# Patient Record
Sex: FEMALE | Race: WHITE | Employment: PART TIME | URBAN - METROPOLITAN AREA
[De-identification: names, ages, dates, MRNs, and addresses within clinical notes are randomized per-mention and may not be internally consistent; named-entity substitution may affect disease eponyms.]

---

## 2017-02-06 ENCOUNTER — TELEPHONE (OUTPATIENT)
Dept: INTERNAL MEDICINE CLINIC | Facility: CLINIC | Age: 47
End: 2017-02-06

## 2017-02-06 NOTE — TELEPHONE ENCOUNTER
Patient sent a request via [employer] to schedule an appointment. Her comment was \"chest pains continue\". Please advise.

## 2017-02-06 NOTE — TELEPHONE ENCOUNTER
Spoke with pt. Pt states that a few days ago she woke up with pressure (4-5/10) in middle of chest that lasted for a few hours then went away. Denies SOB, radiation of pain to jaw or arm. States that pain went through to back.  Denies nausea, vomiting, jaden

## 2017-02-07 ENCOUNTER — HOSPITAL ENCOUNTER (OUTPATIENT)
Dept: GENERAL RADIOLOGY | Age: 47
Discharge: HOME | End: 2017-02-07
Attending: INTERNAL MEDICINE
Payer: COMMERCIAL

## 2017-02-07 ENCOUNTER — TELEPHONE (OUTPATIENT)
Dept: INTERNAL MEDICINE CLINIC | Facility: CLINIC | Age: 47
End: 2017-02-07

## 2017-02-07 ENCOUNTER — OFFICE VISIT (OUTPATIENT)
Dept: INTERNAL MEDICINE CLINIC | Facility: CLINIC | Age: 47
End: 2017-02-07

## 2017-02-07 VITALS
OXYGEN SATURATION: 98 % | BODY MASS INDEX: 27.13 KG/M2 | RESPIRATION RATE: 16 BRPM | WEIGHT: 172.88 LBS | SYSTOLIC BLOOD PRESSURE: 106 MMHG | DIASTOLIC BLOOD PRESSURE: 62 MMHG | HEIGHT: 66.75 IN | TEMPERATURE: 98 F | HEART RATE: 88 BPM

## 2017-02-07 DIAGNOSIS — R07.9 CHEST PAIN, UNSPECIFIED TYPE: ICD-10-CM

## 2017-02-07 DIAGNOSIS — R94.31 ABNORMAL EKG: ICD-10-CM

## 2017-02-07 DIAGNOSIS — R79.89 ELEVATED TSH: ICD-10-CM

## 2017-02-07 DIAGNOSIS — R07.9 CHEST PAIN, UNSPECIFIED TYPE: Primary | ICD-10-CM

## 2017-02-07 PROCEDURE — 99214 OFFICE O/P EST MOD 30 MIN: CPT | Performed by: INTERNAL MEDICINE

## 2017-02-07 PROCEDURE — 93000 ELECTROCARDIOGRAM COMPLETE: CPT | Performed by: INTERNAL MEDICINE

## 2017-02-07 PROCEDURE — 71020 XR CHEST PA + LAT CHEST (CPT=71020): CPT

## 2017-02-07 NOTE — PROGRESS NOTES
[facility]    CHIEF COMPLAINT:  Patient presents with:  Chest Pain: no chest pain today. Pt states pain comes and goes. Last episode of chest pain was last tue or wed. Pain was relieved after taking ibuprofen.   Pt had abnormal TSH  per pt 4.3 performed in visit on 09/29/16  -SURGICAL PATHOLOGY TISSUE   Result Value Ref Range   Case Report Surgical Pathology                                Case: [id number]                                   Authorizing Provider:  Vanessa Peterson Cardio Referral - In Network  - EKG with interpretation and Report -IN OFFICE [[id number]]    2. Elevated TSH  Will recheck tsh.     3. Abnormal EKG  QT prolonged. Has chest pain. Will refer to cardiology.    - Cardio Referral - In Network          Return to

## 2017-02-08 NOTE — TELEPHONE ENCOUNTER
Incoming (mail or fax): Fax  Received from:  Dr. Lyn Flor office.   Documentation given to:  Placed on Kamryn's desk

## 2017-02-13 ENCOUNTER — LAB ENCOUNTER (OUTPATIENT)
Dept: LAB | Age: 47
End: 2017-02-13
Attending: INTERNAL MEDICINE
Payer: COMMERCIAL

## 2017-02-13 DIAGNOSIS — R07.9 CHEST PAIN, UNSPECIFIED TYPE: ICD-10-CM

## 2017-02-13 LAB
ALBUMIN SERPL-MCNC: 4 G/DL (ref 3.5–4.8)
ALP LIVER SERPL-CCNC: 46 U/L (ref 37–98)
ALT SERPL-CCNC: 17 U/L (ref 14–54)
AST SERPL-CCNC: 15 U/L (ref 15–41)
BASOPHILS # BLD AUTO: 0.03 X10(3) UL (ref 0–0.1)
BASOPHILS NFR BLD AUTO: 0.3 %
BILIRUB SERPL-MCNC: 0.6 MG/DL (ref 0.1–2)
BUN BLD-MCNC: 12 MG/DL (ref 8–20)
CALCIUM BLD-MCNC: 8.4 MG/DL (ref 8.3–10.3)
CHLORIDE: 101 MMOL/L (ref 101–111)
CHOLEST SMN-MCNC: 129 MG/DL (ref ?–200)
CO2: 27 MMOL/L (ref 22–32)
CREAT BLD-MCNC: 0.79 MG/DL (ref 0.55–1.02)
EOSINOPHIL # BLD AUTO: 0.36 X10(3) UL (ref 0–0.3)
EOSINOPHIL NFR BLD AUTO: 4 %
ERYTHROCYTE [DISTWIDTH] IN BLOOD BY AUTOMATED COUNT: 14.8 % (ref 11.5–16)
GLUCOSE BLD-MCNC: 80 MG/DL (ref 70–99)
HCT VFR BLD AUTO: 40.5 % (ref 34–50)
HDLC SERPL-MCNC: 54 MG/DL (ref 45–?)
HDLC SERPL: 2.39 {RATIO} (ref ?–4.44)
HGB BLD-MCNC: 12.5 G/DL (ref 12–16)
IMMATURE GRANULOCYTE COUNT: 0.04 X10(3) UL (ref 0–1)
IMMATURE GRANULOCYTE RATIO %: 0.4 %
LDLC SERPL CALC-MCNC: 51 MG/DL (ref ?–130)
LYMPHOCYTES # BLD AUTO: 1.29 X10(3) UL (ref 0.9–4)
LYMPHOCYTES NFR BLD AUTO: 14.4 %
M PROTEIN MFR SERPL ELPH: 7.5 G/DL (ref 6.1–8.3)
MCH RBC QN AUTO: 26.2 PG (ref 27–33.2)
MCHC RBC AUTO-ENTMCNC: 30.9 G/DL (ref 31–37)
MCV RBC AUTO: 84.7 FL (ref 81–100)
MONOCYTES # BLD AUTO: 0.56 X10(3) UL (ref 0.1–0.6)
MONOCYTES NFR BLD AUTO: 6.2 %
NEUTROPHIL ABS PRELIM: 6.69 X10 (3) UL (ref 1.3–6.7)
NEUTROPHILS # BLD AUTO: 6.69 X10(3) UL (ref 1.3–6.7)
NEUTROPHILS NFR BLD AUTO: 74.7 %
NONHDLC SERPL-MCNC: 75 MG/DL (ref ?–130)
PLATELET # BLD AUTO: 300 10(3)UL (ref 150–450)
POTASSIUM SERPL-SCNC: 4.4 MMOL/L (ref 3.6–5.1)
RBC # BLD AUTO: 4.78 X10(6)UL (ref 3.8–5.1)
RED CELL DISTRIBUTION WIDTH-SD: 45.3 FL (ref 35.1–46.3)
SODIUM SERPL-SCNC: 135 MMOL/L (ref 136–144)
TRIGLYCERIDES: 120 MG/DL (ref ?–150)
TSI SER-ACNC: 2.78 MIU/ML (ref 0.35–5.5)
VLDL: 24 MG/DL (ref 5–40)
WBC # BLD AUTO: 9 X10(3) UL (ref 4–13)

## 2017-02-13 PROCEDURE — 84443 ASSAY THYROID STIM HORMONE: CPT

## 2017-02-13 PROCEDURE — 80053 COMPREHEN METABOLIC PANEL: CPT

## 2017-02-13 PROCEDURE — 80061 LIPID PANEL: CPT

## 2017-02-13 PROCEDURE — 85025 COMPLETE CBC W/AUTO DIFF WBC: CPT

## 2017-02-15 ENCOUNTER — HOSPITAL ENCOUNTER (OUTPATIENT)
Dept: CV DIAGNOSTICS | Age: 47
Discharge: HOME | End: 2017-02-15
Attending: INTERNAL MEDICINE
Payer: COMMERCIAL

## 2017-02-15 DIAGNOSIS — R07.9 CHEST PAIN, UNSPECIFIED TYPE: ICD-10-CM

## 2017-02-15 PROCEDURE — 93306 TTE W/DOPPLER COMPLETE: CPT | Performed by: INTERNAL MEDICINE

## 2017-02-15 PROCEDURE — 93306 TTE W/DOPPLER COMPLETE: CPT

## 2017-03-07 ENCOUNTER — OFFICE VISIT (OUTPATIENT)
Dept: INTERNAL MEDICINE CLINIC | Facility: CLINIC | Age: 47
End: 2017-03-07

## 2017-03-07 VITALS
HEART RATE: 72 BPM | RESPIRATION RATE: 12 BRPM | DIASTOLIC BLOOD PRESSURE: 80 MMHG | WEIGHT: 173.31 LBS | TEMPERATURE: 98 F | SYSTOLIC BLOOD PRESSURE: 118 MMHG | BODY MASS INDEX: 27.2 KG/M2 | HEIGHT: 66.75 IN

## 2017-03-07 DIAGNOSIS — Z00.00 PHYSICAL EXAM, ANNUAL: Primary | ICD-10-CM

## 2017-03-07 DIAGNOSIS — R07.9 CHEST PAIN, UNSPECIFIED TYPE: ICD-10-CM

## 2017-03-07 PROCEDURE — 99396 PREV VISIT EST AGE 40-64: CPT | Performed by: INTERNAL MEDICINE

## 2017-03-07 NOTE — PROGRESS NOTES
[facility]    HPI:   Afsaneh Mendes is a 42year old female who presents for a complete physical exam. Symptoms: denies discharge, itching, burning or dysuria. Chest pain is improved. Still has it in the morning usually.  She has an appt wi Comment: 1 drink per week    Occ: mammo tech. : yes.     Exercise: minimal.  Diet: watches minimally     REVIEW OF SYSTEMS:   GENERAL: feels well otherwise  SKIN: denies any unusual skin lesions  EYES:denies blurred vision or double vision  HEENT: 02/13/2017 07:53 AM   ALB 4.0 02/13/2017 07:53 AM   TP 7.5 02/13/2017 07:53 AM   ALKPHO 46 02/13/2017 07:53 AM   AST 15 02/13/2017 07:53 AM   ALT 17 02/13/2017 07:53 AM   BILT 0.6 02/13/2017 07:53 AM   TSH 2.780 02/13/2017 07:53 AM          Lab Results  Co

## 2017-06-13 ENCOUNTER — LAB ENCOUNTER (OUTPATIENT)
Dept: LAB | Age: 47
End: 2017-06-13
Attending: OBSTETRICS & GYNECOLOGY
Payer: COMMERCIAL

## 2017-06-13 ENCOUNTER — LAB ENCOUNTER (OUTPATIENT)
Dept: LAB | Age: 47
End: 2017-06-13
Attending: INTERNAL MEDICINE
Payer: COMMERCIAL

## 2017-06-13 DIAGNOSIS — R69 DIAGNOSIS UNKNOWN: Primary | ICD-10-CM

## 2017-06-13 DIAGNOSIS — Z01.419 PAP SMEAR, LOW-RISK: Primary | ICD-10-CM

## 2017-06-13 PROCEDURE — 88175 CYTOPATH C/V AUTO FLUID REDO: CPT

## 2017-06-13 PROCEDURE — 87624 HPV HI-RISK TYP POOLED RSLT: CPT

## 2017-09-08 PROBLEM — I45.81 LONG QT SYNDROME TYPE 1: Status: ACTIVE | Noted: 2017-09-08

## 2017-10-03 ENCOUNTER — OFFICE VISIT (OUTPATIENT)
Dept: GENETICS | Facility: HOSPITAL | Age: 47
End: 2017-10-03
Attending: GENETIC COUNSELOR, MS
Payer: COMMERCIAL

## 2017-10-03 PROCEDURE — 96040 HC GENETIC COUNSELING EA 30 MIN: CPT | Performed by: GENETIC COUNSELOR, MS

## 2017-10-03 NOTE — PROGRESS NOTES
Referring Provider: Selma Jay MD    Additional Providers: Sera Elena MD    Reason for Referral:  Audrey Sena was referred for genetic counseling because of a personal history of long QT syndrome and a positive genetic test result.   Ms. Karen Johnson other known medical problems. Please see the pedigree for additional family history information. Counseling: The following information was discussed with Ms. Howie Khalil.     Long QT Syndrome:  ECG findings in individuals with long QT syndrome include QT affected individuals the first symptom of long QT syndrome is sudden cardiac death. Ms. Nikki Martino son lives in [place]. She will encourage him to schedule an appointment with a specialist in [place] to coordinate genetic testing.   Ms. Navarro Magdalena regarding these results or if there are any changes to the family history. Approximately 50 minutes was spent in consultation with Ms. Janel Panchal.

## 2017-10-17 ENCOUNTER — HOSPITAL ENCOUNTER (OUTPATIENT)
Dept: MAMMOGRAPHY | Age: 47
Discharge: HOME | End: 2017-10-17
Attending: OBSTETRICS & GYNECOLOGY
Payer: COMMERCIAL

## 2017-10-17 DIAGNOSIS — Z12.31 ENCOUNTER FOR SCREENING MAMMOGRAM FOR MALIGNANT NEOPLASM OF BREAST: ICD-10-CM

## 2017-10-17 PROCEDURE — 77063 BREAST TOMOSYNTHESIS BI: CPT | Performed by: OBSTETRICS & GYNECOLOGY

## 2017-10-17 PROCEDURE — 77067 SCR MAMMO BI INCL CAD: CPT | Performed by: OBSTETRICS & GYNECOLOGY

## 2017-11-08 ENCOUNTER — HOSPITAL ENCOUNTER (OUTPATIENT)
Dept: MAMMOGRAPHY | Facility: HOSPITAL | Age: 47
Discharge: HOME | End: 2017-11-08
Attending: OBSTETRICS & GYNECOLOGY
Payer: COMMERCIAL

## 2017-11-08 DIAGNOSIS — R92.8 ABNORMAL MAMMOGRAM OF BOTH BREASTS: ICD-10-CM

## 2017-11-08 PROCEDURE — 77062 BREAST TOMOSYNTHESIS BI: CPT | Performed by: OBSTETRICS & GYNECOLOGY

## 2017-11-08 PROCEDURE — 77066 DX MAMMO INCL CAD BI: CPT | Performed by: OBSTETRICS & GYNECOLOGY

## 2018-04-22 ENCOUNTER — HOSPITAL ENCOUNTER (EMERGENCY)
Facility: HOSPITAL | Age: 48
Discharge: HOME | End: 2018-04-22
Attending: EMERGENCY MEDICINE
Payer: COMMERCIAL

## 2018-04-22 VITALS
WEIGHT: 174 LBS | BODY MASS INDEX: 27.97 KG/M2 | HEIGHT: 66 IN | TEMPERATURE: 99 F | OXYGEN SATURATION: 99 % | DIASTOLIC BLOOD PRESSURE: 80 MMHG | SYSTOLIC BLOOD PRESSURE: 125 MMHG | HEART RATE: 77 BPM | RESPIRATION RATE: 16 BRPM
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
## 2018-04-22 PROCEDURE — 99283 EMERGENCY DEPT VISIT LOW MDM: CPT

## 2018-04-22 RX ORDER — AMOXICILLIN 500 MG/1
500 TABLET, FILM COATED ORAL 3 TIMES DAILY
Qty: 30 TABLET | Refills: 0 | Status: SHIPPED | OUTPATIENT
Start: 2018-04-22 | End: 2018-05-02

## 2018-04-23 NOTE — ED INITIAL ASSESSMENT (HPI)
Patient complaining of pain to the right side of her face. Patient reports she removed a tick underneath her right eye; states she removed the whole tick. Denies changes in vision.

## 2018-04-23 NOTE — ED PROVIDER NOTES
Patient Seen in: [facility] Emergency Department    History   Patient presents with:  Bite Sting,Insect (integumentary)    Stated Complaint: insect bite    HPI    44-year-old female with a history of ovarian cyst, history of prolonged QT syndrome, p 77  Resp: 16  Temp: 98.7 °F (37.1 °C)  Temp src: Temporal  SpO2: 99 %  O2 Device: None (Room air)    Current:/80   Pulse 77   Temp 98.7 °F (37.1 °C) (Temporal)   Resp 16   Ht 167.6 cm (5' 6\")   Wt 78.9 kg   LMP 04/02/2018   SpO2 99%   BMI 28.08 kg/m 0

## 2018-04-23 NOTE — ED NOTES
Pt reports she was at [employer] from 1300 to 1400 and discovered a tic under right eye at 2000 - removed and now redness.

## 2019-04-12 ENCOUNTER — HOSPITAL ENCOUNTER (OUTPATIENT)
Dept: MAMMOGRAPHY | Age: 49
Discharge: HOME | End: 2019-04-12
Attending: INTERNAL MEDICINE
Payer: COMMERCIAL

## 2019-04-12 DIAGNOSIS — Z12.31 ENCOUNTER FOR SCREENING MAMMOGRAM FOR MALIGNANT NEOPLASM OF BREAST: ICD-10-CM

## 2019-04-12 PROCEDURE — 77063 BREAST TOMOSYNTHESIS BI: CPT | Performed by: INTERNAL MEDICINE

## 2019-04-12 PROCEDURE — 77067 SCR MAMMO BI INCL CAD: CPT | Performed by: INTERNAL MEDICINE

## 2019-09-12 ENCOUNTER — OFFICE VISIT (OUTPATIENT)
Dept: INTERNAL MEDICINE CLINIC | Facility: CLINIC | Age: 49
End: 2019-09-12

## 2019-09-12 ENCOUNTER — TELEPHONE (OUTPATIENT)
Dept: INTERNAL MEDICINE CLINIC | Facility: CLINIC | Age: 49
End: 2019-09-12

## 2019-09-12 VITALS
HEIGHT: 66.5 IN | DIASTOLIC BLOOD PRESSURE: 72 MMHG | SYSTOLIC BLOOD PRESSURE: 118 MMHG | TEMPERATURE: 98 F | RESPIRATION RATE: 12 BRPM | WEIGHT: 176.69 LBS | HEART RATE: 60 BPM | BODY MASS INDEX: 28.06 KG/M2

## 2019-09-12 DIAGNOSIS — Z00.00 PHYSICAL EXAM, ANNUAL: Primary | ICD-10-CM

## 2019-09-12 DIAGNOSIS — K13.70 ORAL LESION: Primary | ICD-10-CM

## 2019-09-12 PROCEDURE — 99396 PREV VISIT EST AGE 40-64: CPT | Performed by: INTERNAL MEDICINE

## 2019-09-12 NOTE — PROGRESS NOTES
[facility]    CHIEF COMPLAINT: Patient presents with:  Routine Physical: 6/13/17-pap. 4/12/19-mammo  Canker Sores  Imm/Inj: declines flu shot.          HPI:   Regina Shin is a 57year old female who presents for a complete physical exam. Sy UPPER ARM/ELBOW SURGERY UNLISTED  1997    left elbow for osteomyelitis      Family History   Problem Relation Age of Onset   • Hypertension Mother    • Cancer Paternal Grandmother         liver cancer?    • Hypertension Father    • Other (MI) Father 76 tenderness  BREAST: Deferred. To be done at [facility]. GENITAL/URINARY:  Deferred. To be done at [facility].     MUSCULOSKELETAL: back is not tender,FROM of the back  EXTREMITIES: no cyanosis, clubbing or edema  NEURO: Oriented times three,cranial nerves are intact

## 2019-09-12 NOTE — TELEPHONE ENCOUNTER
Pt saw dentist and the dentist told her she needs to see oral surgeon. Fax from oral surgery center received stating she needs excision of benign lesion. Pt does not have name or preference of oral surgeon. Dr Garrett Fernandez? Dr Cecilia Sellers?

## 2019-09-12 NOTE — TELEPHONE ENCOUNTER
Incoming (mail or fax):  Fax  Received from: [facility] [address] [place] [address] Gabriella   Documentation given to: Triage

## 2019-09-12 NOTE — TELEPHONE ENCOUNTER
Patient saw Dr Nyra Leventhal today who suggested she go to a dentist for a problem she was having.   The dentist said she needs to go an oral surgeon so she will need a referral.  Patient aware Dr Nyra Leventhal will need a copy of the office visit notes from the dentist.

## 2019-09-13 NOTE — TELEPHONE ENCOUNTER
Patient returned call, advised of information below. Patient requests this be sent to her via [address]  [employer] message sent, also with notification that referral cannot be submitted until we have the name of the provider she chooses, and that it can then t

## 2019-09-13 NOTE — TELEPHONE ENCOUNTER
Patient sent [employer] message advising she would like to see Dr. Lowell Souza. Referral updated and signed. [employer] message sent to patient to advise that she should wait for approval prior to attending any appointments.

## 2019-09-13 NOTE — TELEPHONE ENCOUNTER
Left message for patient to call back. Per notes below, oral surgeons recommended are:    Stephane Rodriguez W.  [facility] [address] [facility], [address] [place] [address] Rd   [address] [facility] [address] [place] [address], [address] [facility]  [phone]

## 2019-09-20 ENCOUNTER — LAB ENCOUNTER (OUTPATIENT)
Dept: LAB | Age: 49
End: 2019-09-20
Attending: INTERNAL MEDICINE
Payer: COMMERCIAL

## 2019-09-20 DIAGNOSIS — Z00.00 PHYSICAL EXAM, ANNUAL: ICD-10-CM

## 2019-09-20 LAB
ALBUMIN SERPL-MCNC: 3.7 G/DL (ref 3.4–5)
ALBUMIN/GLOB SERPL: 1.1 {RATIO} (ref 1–2)
ALP LIVER SERPL-CCNC: 50 U/L (ref 39–100)
ALT SERPL-CCNC: 13 U/L (ref 13–56)
ANION GAP SERPL CALC-SCNC: 5 MMOL/L (ref 0–18)
AST SERPL-CCNC: 9 U/L (ref 15–37)
BASOPHILS # BLD AUTO: 0.04 X10(3) UL (ref 0–0.2)
BASOPHILS NFR BLD AUTO: 0.4 %
BILIRUB SERPL-MCNC: 0.6 MG/DL (ref 0.1–2)
BUN BLD-MCNC: 12 MG/DL (ref 7–18)
BUN/CREAT SERPL: 14 (ref 10–20)
CALCIUM BLD-MCNC: 8.1 MG/DL (ref 8.5–10.1)
CHLORIDE SERPL-SCNC: 108 MMOL/L (ref 98–112)
CHOLEST SMN-MCNC: 126 MG/DL (ref ?–200)
CO2 SERPL-SCNC: 26 MMOL/L (ref 21–32)
CREAT BLD-MCNC: 0.86 MG/DL (ref 0.55–1.02)
DEPRECATED RDW RBC AUTO: 46.5 FL (ref 35.1–46.3)
EOSINOPHIL # BLD AUTO: 0.3 X10(3) UL (ref 0–0.7)
EOSINOPHIL NFR BLD AUTO: 3 %
ERYTHROCYTE [DISTWIDTH] IN BLOOD BY AUTOMATED COUNT: 15.2 % (ref 11–15)
GLOBULIN PLAS-MCNC: 3.4 G/DL (ref 2.8–4.4)
GLUCOSE BLD-MCNC: 100 MG/DL (ref 70–99)
HCT VFR BLD AUTO: 38.6 % (ref 35–48)
HDLC SERPL-MCNC: 41 MG/DL (ref 40–59)
HGB BLD-MCNC: 12.3 G/DL (ref 12–16)
IMM GRANULOCYTES # BLD AUTO: 0.03 X10(3) UL (ref 0–1)
IMM GRANULOCYTES NFR BLD: 0.3 %
LDLC SERPL CALC-MCNC: 57 MG/DL (ref ?–100)
LYMPHOCYTES # BLD AUTO: 1.35 X10(3) UL (ref 1–4)
LYMPHOCYTES NFR BLD AUTO: 13.4 %
M PROTEIN MFR SERPL ELPH: 7.1 G/DL (ref 6.4–8.2)
MCH RBC QN AUTO: 26.5 PG (ref 26–34)
MCHC RBC AUTO-ENTMCNC: 31.9 G/DL (ref 31–37)
MCV RBC AUTO: 83.2 FL (ref 80–100)
MONOCYTES # BLD AUTO: 0.72 X10(3) UL (ref 0.1–1)
MONOCYTES NFR BLD AUTO: 7.2 %
NEUTROPHILS # BLD AUTO: 7.62 X10 (3) UL (ref 1.5–7.7)
NEUTROPHILS # BLD AUTO: 7.62 X10(3) UL (ref 1.5–7.7)
NEUTROPHILS NFR BLD AUTO: 75.7 %
NONHDLC SERPL-MCNC: 85 MG/DL (ref ?–130)
OSMOLALITY SERPL CALC.SUM OF ELEC: 288 MOSM/KG (ref 275–295)
PLATELET # BLD AUTO: 286 10(3)UL (ref 150–450)
POTASSIUM SERPL-SCNC: 4 MMOL/L (ref 3.5–5.1)
RBC # BLD AUTO: 4.64 X10(6)UL (ref 3.8–5.3)
SODIUM SERPL-SCNC: 139 MMOL/L (ref 136–145)
TRIGL SERPL-MCNC: 139 MG/DL (ref 30–149)
TSI SER-ACNC: 2.83 MIU/ML (ref 0.36–3.74)
VLDLC SERPL CALC-MCNC: 28 MG/DL (ref 0–30)
WBC # BLD AUTO: 10.1 X10(3) UL (ref 4–11)

## 2019-09-20 PROCEDURE — 84443 ASSAY THYROID STIM HORMONE: CPT

## 2019-09-20 PROCEDURE — 80053 COMPREHEN METABOLIC PANEL: CPT

## 2019-09-20 PROCEDURE — 36415 COLL VENOUS BLD VENIPUNCTURE: CPT

## 2019-09-20 PROCEDURE — 80061 LIPID PANEL: CPT

## 2019-09-20 PROCEDURE — 85025 COMPLETE CBC W/AUTO DIFF WBC: CPT

## 2019-09-25 DIAGNOSIS — R73.9 ELEVATED SERUM GLUCOSE: ICD-10-CM

## 2019-09-25 DIAGNOSIS — R79.89 LOW SERUM CALCIUM: Primary | ICD-10-CM

## 2019-09-27 NOTE — TELEPHONE ENCOUNTER
Referral status changed to pending. Reached out to referrals specialist, awaiting response regarding if further is needed from office. Will monitor status today and send patient [employer] message by end of day with update.

## 2019-09-30 NOTE — TELEPHONE ENCOUNTER
Referral approved, [employer] message sent to patient to advise, including expiration date and contact information for Dr. Mat Alvarez.

## 2019-10-15 ENCOUNTER — LAB REQUISITION (OUTPATIENT)
Dept: LAB | Facility: HOSPITAL | Age: 49
End: 2019-10-15
Payer: COMMERCIAL

## 2019-10-15 DIAGNOSIS — Z01.818 ENCOUNTER FOR OTHER PREPROCEDURAL EXAMINATION: ICD-10-CM

## 2019-10-15 PROCEDURE — 88305 TISSUE EXAM BY PATHOLOGIST: CPT | Performed by: DENTIST

## 2019-11-21 ENCOUNTER — APPOINTMENT (OUTPATIENT)
Dept: LAB | Age: 49
End: 2019-11-21
Attending: INTERNAL MEDICINE
Payer: COMMERCIAL

## 2019-11-21 DIAGNOSIS — R79.89 LOW SERUM CALCIUM: ICD-10-CM

## 2019-11-21 DIAGNOSIS — R73.9 ELEVATED SERUM GLUCOSE: ICD-10-CM

## 2019-11-21 PROCEDURE — 36415 COLL VENOUS BLD VENIPUNCTURE: CPT

## 2019-11-21 PROCEDURE — 80048 BASIC METABOLIC PNL TOTAL CA: CPT

## 2021-01-11 ENCOUNTER — EMPLOYEE HEALTH (OUTPATIENT)
Dept: OTHER | Age: 51
End: 2021-01-11

## 2021-01-11 ENCOUNTER — LAB SERVICES (OUTPATIENT)
Dept: OCCUPATIONAL MEDICINE | Age: 51
End: 2021-01-11

## 2021-01-11 DIAGNOSIS — Z20.822 SUSPECTED COVID-19 VIRUS INFECTION: Primary | ICD-10-CM

## 2021-01-13 ENCOUNTER — EMPLOYEE HEALTH (OUTPATIENT)
Dept: OTHER | Age: 51
End: 2021-01-13

## 2021-03-19 ENCOUNTER — EMPLOYEE HEALTH (OUTPATIENT)
Dept: OTHER | Age: 51
End: 2021-03-19

## 2021-03-19 ENCOUNTER — LAB SERVICES (OUTPATIENT)
Dept: OCCUPATIONAL MEDICINE | Age: 51
End: 2021-03-19

## 2021-03-19 DIAGNOSIS — Z20.822 SUSPECTED COVID-19 VIRUS INFECTION: Primary | ICD-10-CM

## 2021-03-19 DIAGNOSIS — Z20.822 SUSPECTED COVID-19 VIRUS INFECTION: ICD-10-CM

## 2021-03-19 PROCEDURE — U0003 INFECTIOUS AGENT DETECTION BY NUCLEIC ACID (DNA OR RNA); SEVERE ACUTE RESPIRATORY SYNDROME CORONAVIRUS 2 (SARS-COV-2) (CORONAVIRUS DISEASE [COVID-19]), AMPLIFIED PROBE TECHNIQUE, MAKING USE OF HIGH THROUGHPUT TECHNOLOGIES AS DESCRIBED BY CMS-2020-01-R: HCPCS | Performed by: PATHOLOGY

## 2021-03-19 PROCEDURE — U0005 INFEC AGEN DETEC AMPLI PROBE: HCPCS | Performed by: PATHOLOGY

## 2021-03-20 ENCOUNTER — EMPLOYEE HEALTH (OUTPATIENT)
Dept: OTHER | Age: 51
End: 2021-03-20

## 2021-03-20 LAB
SARS-COV-2 RNA RESP QL NAA+PROBE: NOT DETECTED
SERVICE CMNT-IMP: NORMAL
SERVICE CMNT-IMP: NORMAL

## 2021-09-13 ENCOUNTER — IMMUNIZATION (OUTPATIENT)
Dept: LAB | Age: 51
End: 2021-09-13

## 2021-09-13 DIAGNOSIS — Z23 NEED FOR VACCINATION: Primary | ICD-10-CM

## 2021-09-13 PROCEDURE — 91303: CPT

## 2021-09-13 PROCEDURE — 0031A: CPT

## 2021-10-25 ENCOUNTER — EMPLOYEE HEALTH (OUTPATIENT)
Dept: OTHER | Age: 51
End: 2021-10-25

## 2021-10-25 DIAGNOSIS — U07.1 COVID-19 VIRUS DETECTED: Primary | ICD-10-CM

## 2021-10-25 DIAGNOSIS — Z20.822 SUSPECTED COVID-19 VIRUS INFECTION: ICD-10-CM

## 2021-10-30 ENCOUNTER — EMPLOYEE HEALTH (OUTPATIENT)
Dept: OTHER | Age: 51
End: 2021-10-30

## 2022-05-16 ENCOUNTER — TELEPHONE (OUTPATIENT)
Dept: INTERNAL MEDICINE CLINIC | Facility: CLINIC | Age: 52
End: 2022-05-16

## 2022-05-16 NOTE — TELEPHONE ENCOUNTER
Has not seen me since 2019. If I am still her pcp she needs to make an appt for cpx. Pap is due. Please call pt.

## 2022-05-18 ENCOUNTER — OFFICE VISIT (OUTPATIENT)
Dept: INTERNAL MEDICINE CLINIC | Facility: CLINIC | Age: 52
End: 2022-05-18
Payer: COMMERCIAL

## 2022-05-18 VITALS
HEIGHT: 66.3 IN | HEART RATE: 61 BPM | OXYGEN SATURATION: 96 % | TEMPERATURE: 98 F | SYSTOLIC BLOOD PRESSURE: 104 MMHG | WEIGHT: 186.63 LBS | BODY MASS INDEX: 29.99 KG/M2 | RESPIRATION RATE: 14 BRPM | DIASTOLIC BLOOD PRESSURE: 70 MMHG

## 2022-05-18 DIAGNOSIS — Z23 NEED FOR VACCINATION: ICD-10-CM

## 2022-05-18 DIAGNOSIS — Z12.11 COLON CANCER SCREENING: ICD-10-CM

## 2022-05-18 DIAGNOSIS — Z12.4 CERVICAL CANCER SCREENING: ICD-10-CM

## 2022-05-18 DIAGNOSIS — E04.9 ENLARGED THYROID: ICD-10-CM

## 2022-05-18 DIAGNOSIS — Z12.31 SCREENING MAMMOGRAM FOR BREAST CANCER: ICD-10-CM

## 2022-05-18 DIAGNOSIS — D22.9 NUMEROUS MOLES: ICD-10-CM

## 2022-05-18 DIAGNOSIS — Z12.11 ENCOUNTER FOR SCREENING FECAL OCCULT BLOOD TESTING: ICD-10-CM

## 2022-05-18 DIAGNOSIS — Z00.00 PHYSICAL EXAM, ANNUAL: ICD-10-CM

## 2022-05-18 PROCEDURE — 88175 CYTOPATH C/V AUTO FLUID REDO: CPT | Performed by: INTERNAL MEDICINE

## 2022-05-18 PROCEDURE — 90677 PCV20 VACCINE IM: CPT | Performed by: INTERNAL MEDICINE

## 2022-05-18 PROCEDURE — 90471 IMMUNIZATION ADMIN: CPT | Performed by: INTERNAL MEDICINE

## 2022-05-18 PROCEDURE — 82270 OCCULT BLOOD FECES: CPT | Performed by: INTERNAL MEDICINE

## 2022-05-18 PROCEDURE — 3008F BODY MASS INDEX DOCD: CPT | Performed by: INTERNAL MEDICINE

## 2022-05-18 PROCEDURE — 87624 HPV HI-RISK TYP POOLED RSLT: CPT | Performed by: INTERNAL MEDICINE

## 2022-05-18 PROCEDURE — 99000 SPECIMEN HANDLING OFFICE-LAB: CPT | Performed by: INTERNAL MEDICINE

## 2022-05-18 PROCEDURE — 3078F DIAST BP <80 MM HG: CPT | Performed by: INTERNAL MEDICINE

## 2022-05-18 PROCEDURE — 82272 OCCULT BLD FECES 1-3 TESTS: CPT | Performed by: INTERNAL MEDICINE

## 2022-05-18 PROCEDURE — 99396 PREV VISIT EST AGE 40-64: CPT | Performed by: INTERNAL MEDICINE

## 2022-05-18 PROCEDURE — 3074F SYST BP LT 130 MM HG: CPT | Performed by: INTERNAL MEDICINE

## 2022-05-19 ENCOUNTER — PATIENT OUTREACH (OUTPATIENT)
Dept: INTERNAL MEDICINE CLINIC | Facility: CLINIC | Age: 52
End: 2022-05-19

## 2022-05-19 NOTE — PROGRESS NOTES
Quality: - Did not reach out to pt. PE and PAP was completed on 5/18/22 - Mammo scheduled for 5/24/22.

## 2022-05-23 LAB — HPV I/H RISK 1 DNA SPEC QL NAA+PROBE: NEGATIVE

## 2022-05-24 ENCOUNTER — LAB REQUISITION (OUTPATIENT)
Dept: LAB | Facility: HOSPITAL | Age: 52
End: 2022-05-24
Payer: COMMERCIAL

## 2022-05-24 ENCOUNTER — HOSPITAL ENCOUNTER (OUTPATIENT)
Dept: MAMMOGRAPHY | Age: 52
Discharge: HOME | End: 2022-05-24
Attending: INTERNAL MEDICINE
Payer: COMMERCIAL

## 2022-05-24 ENCOUNTER — LAB ENCOUNTER (OUTPATIENT)
Dept: LAB | Age: 52
End: 2022-05-24
Attending: INTERNAL MEDICINE
Payer: COMMERCIAL

## 2022-05-24 DIAGNOSIS — Z00.00 PHYSICAL EXAM, ANNUAL: ICD-10-CM

## 2022-05-24 DIAGNOSIS — D48.5 NEOPLASM OF UNCERTAIN BEHAVIOR OF SKIN: ICD-10-CM

## 2022-05-24 DIAGNOSIS — Z12.31 SCREENING MAMMOGRAM FOR BREAST CANCER: ICD-10-CM

## 2022-05-24 LAB
ALBUMIN SERPL-MCNC: 4.1 G/DL (ref 3.4–5)
ALBUMIN/GLOB SERPL: 1.3 {RATIO} (ref 1–2)
ALP LIVER SERPL-CCNC: 58 U/L
ALT SERPL-CCNC: 18 U/L
ANION GAP SERPL CALC-SCNC: 6 MMOL/L (ref 0–18)
AST SERPL-CCNC: 14 U/L (ref 15–37)
BASOPHILS # BLD AUTO: 0.05 X10(3) UL (ref 0–0.2)
BASOPHILS NFR BLD AUTO: 0.8 %
BILIRUB SERPL-MCNC: 0.7 MG/DL (ref 0.1–2)
BUN BLD-MCNC: 15 MG/DL (ref 7–18)
CALCIUM BLD-MCNC: 9.2 MG/DL (ref 8.5–10.1)
CHLORIDE SERPL-SCNC: 104 MMOL/L (ref 98–112)
CHOLEST SERPL-MCNC: 155 MG/DL (ref ?–200)
CO2 SERPL-SCNC: 31 MMOL/L (ref 21–32)
CREAT BLD-MCNC: 1.03 MG/DL
EOSINOPHIL # BLD AUTO: 0.37 X10(3) UL (ref 0–0.7)
EOSINOPHIL NFR BLD AUTO: 6.1 %
ERYTHROCYTE [DISTWIDTH] IN BLOOD BY AUTOMATED COUNT: 12.3 %
EST. AVERAGE GLUCOSE BLD GHB EST-MCNC: 126 MG/DL (ref 68–126)
FASTING PATIENT LIPID ANSWER: YES
FASTING STATUS PATIENT QL REPORTED: YES
GLOBULIN PLAS-MCNC: 3.2 G/DL (ref 2.8–4.4)
GLUCOSE BLD-MCNC: 101 MG/DL (ref 70–99)
HBA1C MFR BLD: 6 % (ref ?–5.7)
HCT VFR BLD AUTO: 46.2 %
HDLC SERPL-MCNC: 40 MG/DL (ref 40–59)
HGB BLD-MCNC: 15.1 G/DL
IMM GRANULOCYTES # BLD AUTO: 0.01 X10(3) UL (ref 0–1)
IMM GRANULOCYTES NFR BLD: 0.2 %
LDLC SERPL CALC-MCNC: 73 MG/DL (ref ?–100)
LYMPHOCYTES # BLD AUTO: 1.45 X10(3) UL (ref 1–4)
LYMPHOCYTES NFR BLD AUTO: 23.8 %
MCH RBC QN AUTO: 30.3 PG (ref 26–34)
MCHC RBC AUTO-ENTMCNC: 32.7 G/DL (ref 31–37)
MCV RBC AUTO: 92.8 FL
MONOCYTES # BLD AUTO: 0.47 X10(3) UL (ref 0.1–1)
MONOCYTES NFR BLD AUTO: 7.7 %
NEUTROPHILS # BLD AUTO: 3.74 X10 (3) UL (ref 1.5–7.7)
NEUTROPHILS # BLD AUTO: 3.74 X10(3) UL (ref 1.5–7.7)
NEUTROPHILS NFR BLD AUTO: 61.4 %
NONHDLC SERPL-MCNC: 115 MG/DL (ref ?–130)
OSMOLALITY SERPL CALC.SUM OF ELEC: 293 MOSM/KG (ref 275–295)
PLATELET # BLD AUTO: 220 10(3)UL (ref 150–450)
POTASSIUM SERPL-SCNC: 4.6 MMOL/L (ref 3.5–5.1)
PROT SERPL-MCNC: 7.3 G/DL (ref 6.4–8.2)
RBC # BLD AUTO: 4.98 X10(6)UL
SODIUM SERPL-SCNC: 141 MMOL/L (ref 136–145)
TRIGL SERPL-MCNC: 255 MG/DL (ref 30–149)
TSI SER-ACNC: 3.53 MIU/ML (ref 0.36–3.74)
VLDLC SERPL CALC-MCNC: 39 MG/DL (ref 0–30)
WBC # BLD AUTO: 6.1 X10(3) UL (ref 4–11)

## 2022-05-24 PROCEDURE — 88305 TISSUE EXAM BY PATHOLOGIST: CPT | Performed by: DERMATOLOGY

## 2022-05-24 PROCEDURE — 80061 LIPID PANEL: CPT

## 2022-05-24 PROCEDURE — 83036 HEMOGLOBIN GLYCOSYLATED A1C: CPT

## 2022-05-24 PROCEDURE — 85025 COMPLETE CBC W/AUTO DIFF WBC: CPT

## 2022-05-24 PROCEDURE — 36415 COLL VENOUS BLD VENIPUNCTURE: CPT

## 2022-05-24 PROCEDURE — 77063 BREAST TOMOSYNTHESIS BI: CPT | Performed by: INTERNAL MEDICINE

## 2022-05-24 PROCEDURE — 80053 COMPREHEN METABOLIC PANEL: CPT

## 2022-05-24 PROCEDURE — 84443 ASSAY THYROID STIM HORMONE: CPT

## 2022-05-24 PROCEDURE — 77067 SCR MAMMO BI INCL CAD: CPT | Performed by: INTERNAL MEDICINE

## 2022-05-31 ENCOUNTER — HOSPITAL ENCOUNTER (OUTPATIENT)
Dept: ULTRASOUND IMAGING | Age: 52
Discharge: HOME | End: 2022-05-31
Attending: INTERNAL MEDICINE
Payer: COMMERCIAL

## 2022-05-31 DIAGNOSIS — E04.9 ENLARGED THYROID: ICD-10-CM

## 2022-05-31 DIAGNOSIS — E04.1 THYROID NODULE: Primary | ICD-10-CM

## 2022-05-31 PROCEDURE — 76536 US EXAM OF HEAD AND NECK: CPT | Performed by: INTERNAL MEDICINE

## 2022-06-11 ENCOUNTER — HOSPITAL ENCOUNTER (OUTPATIENT)
Age: 52
Discharge: HOME | End: 2022-06-11
Payer: COMMERCIAL

## 2022-06-11 VITALS
OXYGEN SATURATION: 97 % | TEMPERATURE: 98 F | RESPIRATION RATE: 17 BRPM | DIASTOLIC BLOOD PRESSURE: 85 MMHG | SYSTOLIC BLOOD PRESSURE: 120 MMHG | HEART RATE: 54 BPM

## 2022-06-11 DIAGNOSIS — L23.7 ALLERGIC CONTACT DERMATITIS DUE TO PLANTS, EXCEPT FOOD: Primary | ICD-10-CM

## 2022-06-11 PROCEDURE — 99203 OFFICE O/P NEW LOW 30 MIN: CPT | Performed by: PHYSICIAN ASSISTANT

## 2022-06-11 RX ORDER — PREDNISONE 10 MG/1
TABLET ORAL
Qty: 30 TABLET | Refills: 0 | Status: SHIPPED | OUTPATIENT
Start: 2022-06-11

## 2022-06-11 NOTE — ED INITIAL ASSESSMENT (HPI)
Pt has had a red raised rash to her neck, and left arm, and she took some claritin. Pt has not tried anything new.   [place]

## 2022-07-10 ENCOUNTER — LAB ENCOUNTER (OUTPATIENT)
Dept: LAB | Facility: HOSPITAL | Age: 52
End: 2022-07-10
Attending: OTOLARYNGOLOGY
Payer: COMMERCIAL

## 2022-07-10 DIAGNOSIS — Z13.9 ENCOUNTER FOR SCREENING: ICD-10-CM

## 2022-07-10 LAB — SARS-COV-2 RNA RESP QL NAA+PROBE: NOT DETECTED

## 2022-07-13 ENCOUNTER — HOSPITAL ENCOUNTER (OUTPATIENT)
Dept: ULTRASOUND IMAGING | Facility: HOSPITAL | Age: 52
Discharge: HOME | End: 2022-07-13
Attending: OTOLARYNGOLOGY
Payer: COMMERCIAL

## 2022-07-13 DIAGNOSIS — E04.1 NONTOXIC UNINODULAR GOITER: ICD-10-CM

## 2022-07-13 PROCEDURE — 88173 CYTOPATH EVAL FNA REPORT: CPT | Performed by: OTOLARYNGOLOGY

## 2022-07-13 PROCEDURE — 10005 FNA BX W/US GDN 1ST LES: CPT | Performed by: OTOLARYNGOLOGY

## 2022-07-19 ENCOUNTER — TELEPHONE (OUTPATIENT)
Facility: LOCATION | Age: 52
End: 2022-07-19

## 2022-07-19 DIAGNOSIS — E04.1 THYROID NODULE, UNINODULAR: Primary | ICD-10-CM

## 2022-07-19 NOTE — TELEPHONE ENCOUNTER
Attempted to call patient on her mobile phone approximately [phone] there was no answer I did leave a message to return the phone call.   I will try again in the morning

## 2022-07-20 NOTE — TELEPHONE ENCOUNTER
Patient was given results over the phone of a benign thyroid nodule biopsy was recommended at 1 year follow-up thyroid ultrasound and follow-up in the office.

## 2022-10-30 ENCOUNTER — EMPLOYEE HEALTH (OUTPATIENT)
Dept: OTHER | Age: 52
End: 2022-10-30

## 2023-02-08 ENCOUNTER — PATIENT MESSAGE (OUTPATIENT)
Dept: INTERNAL MEDICINE CLINIC | Facility: CLINIC | Age: 53
End: 2023-02-08

## 2023-02-08 NOTE — TELEPHONE ENCOUNTER
From: Jaylin Francis  To: Dwaine Samuels MD  Sent: 2/8/2023 10:18 AM CST  Subject: Lower abdomen pain    Hello Dr. Keturah Ayon,  Yesterday I had heaviness and pain in lower abdomen. Late evening I had sharp pain on the left side. I don't have any more sharp pain, but still feel lower abdomen discomfort. I didn't have my periods for a while. My last ones were very sporadic last two years. I think my last period was in July and it was very very light. I used to have left ovarian cyst. So, it could be that again. But I would like to check to make sure.    Sincerely,  [facility]

## 2023-02-08 NOTE — TELEPHONE ENCOUNTER
Talked to pt and pt at work now. Pt has mild pain at 3/10 on the bilateral lower abdominal now. Pt had sharp left lower abdominal pain 9/10 yesterday. Pt took ibuprofen and it helped with pain. Pt denies n/v, fever. Advised the pt if pain worsens to go to [facility].  appt made for pt     Future Appointments   Date Time Provider [facility]   2/9/2023  9:40 AM Hazelett, Catherene Curling, APRN [facility] [address]   7/20/2023  9:00 AM KELLY [address]

## 2023-02-09 ENCOUNTER — OFFICE VISIT (OUTPATIENT)
Dept: INTERNAL MEDICINE CLINIC | Facility: CLINIC | Age: 53
End: 2023-02-09
Payer: COMMERCIAL

## 2023-02-09 ENCOUNTER — HOSPITAL ENCOUNTER (OUTPATIENT)
Dept: CT IMAGING | Facility: HOSPITAL | Age: 53
Discharge: HOME | End: 2023-02-09
Attending: NURSE PRACTITIONER
Payer: COMMERCIAL

## 2023-02-09 VITALS
HEART RATE: 62 BPM | DIASTOLIC BLOOD PRESSURE: 72 MMHG | HEIGHT: 66.3 IN | TEMPERATURE: 98 F | RESPIRATION RATE: 14 BRPM | WEIGHT: 185.38 LBS | SYSTOLIC BLOOD PRESSURE: 108 MMHG | OXYGEN SATURATION: 98 % | BODY MASS INDEX: 29.79 KG/M2

## 2023-02-09 DIAGNOSIS — R10.84 GENERALIZED ABDOMINAL PAIN: ICD-10-CM

## 2023-02-09 DIAGNOSIS — R10.2 PELVIC PAIN: ICD-10-CM

## 2023-02-09 DIAGNOSIS — R10.84 GENERALIZED ABDOMINAL PAIN: Primary | ICD-10-CM

## 2023-02-09 LAB
CREAT BLD-MCNC: 1 MG/DL
GFR SERPLBLD BASED ON 1.73 SQ M-ARVRAT: 69 ML/MIN/1.73M2 (ref 60–?)

## 2023-02-09 PROCEDURE — 3008F BODY MASS INDEX DOCD: CPT | Performed by: NURSE PRACTITIONER

## 2023-02-09 PROCEDURE — 74177 CT ABD & PELVIS W/CONTRAST: CPT | Performed by: NURSE PRACTITIONER

## 2023-02-09 PROCEDURE — 3078F DIAST BP <80 MM HG: CPT | Performed by: NURSE PRACTITIONER

## 2023-02-09 PROCEDURE — 3074F SYST BP LT 130 MM HG: CPT | Performed by: NURSE PRACTITIONER

## 2023-02-09 PROCEDURE — 99214 OFFICE O/P EST MOD 30 MIN: CPT | Performed by: NURSE PRACTITIONER

## 2023-02-09 PROCEDURE — 82565 ASSAY OF CREATININE: CPT

## 2023-02-09 RX ORDER — AMOXICILLIN AND CLAVULANATE POTASSIUM 875; 125 MG/1; MG/1
1 TABLET, FILM COATED ORAL 2 TIMES DAILY
Qty: 20 TABLET | Refills: 0 | Status: SHIPPED | OUTPATIENT
Start: 2023-02-09 | End: 2023-02-19

## 2023-02-09 RX ORDER — IBUPROFEN 200 MG
400 TABLET ORAL AS NEEDED
COMMUNITY

## 2023-02-09 NOTE — PATIENT INSTRUCTIONS
Get your CT of the abdomen done    If your pain becomes severe please go to the emergency room as needed    Follow up as needed or when your routine care is due

## 2023-03-10 ENCOUNTER — TELEPHONE (OUTPATIENT)
Dept: INTERNAL MEDICINE CLINIC | Facility: CLINIC | Age: 53
End: 2023-03-10

## 2023-03-10 DIAGNOSIS — Z12.11 SCREENING FOR COLON CANCER: Primary | ICD-10-CM

## 2023-06-23 ENCOUNTER — PATIENT MESSAGE (OUTPATIENT)
Dept: INTERNAL MEDICINE CLINIC | Facility: CLINIC | Age: 53
End: 2023-06-23

## 2023-06-27 ENCOUNTER — PATIENT MESSAGE (OUTPATIENT)
Dept: INTERNAL MEDICINE CLINIC | Facility: CLINIC | Age: 53
End: 2023-06-27

## 2023-06-27 ENCOUNTER — OFFICE VISIT (OUTPATIENT)
Dept: INTERNAL MEDICINE CLINIC | Facility: CLINIC | Age: 53
End: 2023-06-27
Payer: COMMERCIAL

## 2023-06-27 VITALS
HEIGHT: 66 IN | SYSTOLIC BLOOD PRESSURE: 108 MMHG | OXYGEN SATURATION: 99 % | RESPIRATION RATE: 20 BRPM | BODY MASS INDEX: 30.05 KG/M2 | TEMPERATURE: 98 F | HEART RATE: 60 BPM | DIASTOLIC BLOOD PRESSURE: 66 MMHG | WEIGHT: 187 LBS

## 2023-06-27 DIAGNOSIS — N61.1 BREAST ABSCESS: Primary | ICD-10-CM

## 2023-06-27 PROCEDURE — 3074F SYST BP LT 130 MM HG: CPT | Performed by: NURSE PRACTITIONER

## 2023-06-27 PROCEDURE — 3078F DIAST BP <80 MM HG: CPT | Performed by: NURSE PRACTITIONER

## 2023-06-27 PROCEDURE — 99214 OFFICE O/P EST MOD 30 MIN: CPT | Performed by: NURSE PRACTITIONER

## 2023-06-27 PROCEDURE — 3008F BODY MASS INDEX DOCD: CPT | Performed by: NURSE PRACTITIONER

## 2023-06-27 RX ORDER — SULFAMETHOXAZOLE AND TRIMETHOPRIM 800; 160 MG/1; MG/1
1 TABLET ORAL 2 TIMES DAILY
Qty: 14 TABLET | Refills: 0 | Status: SHIPPED | OUTPATIENT
Start: 2023-06-27 | End: 2023-07-04

## 2023-06-28 RX ORDER — CEPHALEXIN 500 MG/1
500 CAPSULE ORAL 4 TIMES DAILY
Qty: 28 CAPSULE | Refills: 0 | Status: SHIPPED | OUTPATIENT
Start: 2023-06-28 | End: 2023-07-05

## 2023-07-17 ENCOUNTER — HOSPITAL ENCOUNTER (OUTPATIENT)
Dept: MAMMOGRAPHY | Age: 53
Discharge: HOME | End: 2023-07-17
Attending: NURSE PRACTITIONER
Payer: COMMERCIAL

## 2023-07-17 DIAGNOSIS — N61.1 BREAST ABSCESS: ICD-10-CM

## 2023-07-17 PROCEDURE — 77067 SCR MAMMO BI INCL CAD: CPT | Performed by: NURSE PRACTITIONER

## 2023-07-17 PROCEDURE — 77063 BREAST TOMOSYNTHESIS BI: CPT | Performed by: NURSE PRACTITIONER

## 2023-07-27 ENCOUNTER — HOSPITAL ENCOUNTER (OUTPATIENT)
Dept: MAMMOGRAPHY | Facility: HOSPITAL | Age: 53
Discharge: HOME | End: 2023-07-27
Attending: NURSE PRACTITIONER
Payer: COMMERCIAL

## 2023-07-27 DIAGNOSIS — R92.2 INCONCLUSIVE MAMMOGRAM: ICD-10-CM

## 2023-07-27 PROCEDURE — 77061 BREAST TOMOSYNTHESIS UNI: CPT | Performed by: NURSE PRACTITIONER

## 2023-07-27 PROCEDURE — 77065 DX MAMMO INCL CAD UNI: CPT | Performed by: NURSE PRACTITIONER

## 2023-07-27 PROCEDURE — 76642 ULTRASOUND BREAST LIMITED: CPT | Performed by: NURSE PRACTITIONER

## 2023-07-27 NOTE — IMAGING NOTE
Snow Westbrook is recommended for an ultrasound guided biopsy of the left breast by . History Inconclusive mammogram   Findings-1.3 cm focus of nodular architectural distortion in retroareolar left breast   Recommendation-ULTRASOUND-GUIDED BIOPSY: LEFT BREAST    See EMR for complete imaging report    Medications and allergies reviewed:  Current Outpatient Medications   Medication Sig Dispense Refill    NADOLOL 40 MG Oral Tab TAKE 1 TABLET(40 MG) BY MOUTH DAILY 90 tablet 3    Multiple Vitamins-Minerals (MULTIVITAMIN OR) Take by mouth daily. The following allergy was reported-  Novocain [Procaine Hcl]DIZZINESS    Snow Westbrook denies the use of prescribed anticoagulants, denies known bleeding disorders and/or liver disease, denies chemotherapy    Procedure explained and questions answered. Snow Westbrook provided with written educational material.     Ms. Anuradha Armas instructed to take 1000 mg of acetaminophen on the day of the biopsy, eat a light meal, and bring or wear a sport bra. Post biopsy care and instruction reviewed: including no lifting more than five pounds, no upper body exercise, icing of biopsy site, no submerging in water. Snow Westbrook verbalized understanding. Snow Westbrook provided with an appointment at [facility] women's imaging center- Monday, July 31 at 0830. Ms. Anuradha Armas instructed to arrive at 0800.

## 2023-07-28 ENCOUNTER — TELEPHONE (OUTPATIENT)
Dept: INTERNAL MEDICINE CLINIC | Facility: CLINIC | Age: 53
End: 2023-07-28

## 2023-07-28 NOTE — TELEPHONE ENCOUNTER
Incoming (mail or fax):  fax  Received from:  Mammography/Breast Biopsy Scheduling  Documentation given to:  Triage incoming    Mammogram results in EPIC

## 2023-07-31 ENCOUNTER — HOSPITAL ENCOUNTER (OUTPATIENT)
Dept: MAMMOGRAPHY | Facility: HOSPITAL | Age: 53
Discharge: HOME | End: 2023-07-31
Attending: NURSE PRACTITIONER
Payer: COMMERCIAL

## 2023-07-31 DIAGNOSIS — R92.8 ABNORMAL MAMMOGRAM: ICD-10-CM

## 2023-07-31 DIAGNOSIS — N64.89 DISTORTION OF CONTOUR OF BREAST: ICD-10-CM

## 2023-07-31 PROCEDURE — 77065 DX MAMMO INCL CAD UNI: CPT | Performed by: NURSE PRACTITIONER

## 2023-07-31 PROCEDURE — 19083 BX BREAST 1ST LESION US IMAG: CPT | Performed by: NURSE PRACTITIONER

## 2023-07-31 PROCEDURE — 88305 TISSUE EXAM BY PATHOLOGIST: CPT | Performed by: NURSE PRACTITIONER

## 2023-08-01 ENCOUNTER — HOSPITAL ENCOUNTER (OUTPATIENT)
Dept: ULTRASOUND IMAGING | Age: 53
Discharge: HOME | End: 2023-08-01
Attending: OTOLARYNGOLOGY
Payer: COMMERCIAL

## 2023-08-01 ENCOUNTER — TELEPHONE (OUTPATIENT)
Dept: MAMMOGRAPHY | Facility: HOSPITAL | Age: 53
End: 2023-08-01

## 2023-08-01 DIAGNOSIS — E04.1 THYROID NODULE, UNINODULAR: ICD-10-CM

## 2023-08-01 PROCEDURE — 76536 US EXAM OF HEAD AND NECK: CPT | Performed by: OTOLARYNGOLOGY

## 2023-08-01 NOTE — TELEPHONE ENCOUNTER
Telephoned [facility] and name,  verified with patient. Notified [facility] of left breast negative for malignancy but positive for complex sclerosing lesion biopsy result. Concordance pending. Malvin Pang reports biopsy site is healing well. Radiologist recommends surgical consultation. [address] [place] [address] office is referring patient to Dr Betsy Freed. Patient was provided with the contact information for Dr Betsy Freed and instructed to call for a consultation appt. Pt verbalized understanding and had no further questions at this time.

## 2023-08-04 ENCOUNTER — MED REC SCAN ONLY (OUTPATIENT)
Dept: INTERNAL MEDICINE CLINIC | Facility: CLINIC | Age: 53
End: 2023-08-04

## 2023-08-04 ENCOUNTER — TELEPHONE (OUTPATIENT)
Dept: MAMMOGRAPHY | Facility: HOSPITAL | Age: 53
End: 2023-08-04

## 2023-08-04 NOTE — TELEPHONE ENCOUNTER
Follow up call to patient re: breast pathology results and Dr Gerard Soria of discordant findings. Explained to patient that pathology is discordant to the breast imaging. The patient already has an appt with Dr Karl Sanchez on 8-28-23 and will discuss further with her. Patient verbalized understanding and has no further questions at this time.

## 2023-08-28 ENCOUNTER — OFFICE VISIT (OUTPATIENT)
Dept: SURGERY | Facility: CLINIC | Age: 53
End: 2023-08-28
Payer: COMMERCIAL

## 2023-08-28 VITALS
WEIGHT: 188.19 LBS | HEIGHT: 67 IN | SYSTOLIC BLOOD PRESSURE: 128 MMHG | OXYGEN SATURATION: 97 % | BODY MASS INDEX: 29.54 KG/M2 | DIASTOLIC BLOOD PRESSURE: 76 MMHG | HEART RATE: 61 BPM | RESPIRATION RATE: 16 BRPM

## 2023-08-28 DIAGNOSIS — N64.89 COMPLEX SCLEROSING LESION OF LEFT BREAST: Primary | ICD-10-CM

## 2023-08-28 PROCEDURE — 3078F DIAST BP <80 MM HG: CPT | Performed by: SURGERY

## 2023-08-28 PROCEDURE — 3008F BODY MASS INDEX DOCD: CPT | Performed by: SURGERY

## 2023-08-28 PROCEDURE — 99204 OFFICE O/P NEW MOD 45 MIN: CPT | Performed by: SURGERY

## 2023-08-28 PROCEDURE — 3074F SYST BP LT 130 MM HG: CPT | Performed by: SURGERY

## 2023-08-29 ENCOUNTER — OFFICE VISIT (OUTPATIENT)
Dept: INTERNAL MEDICINE CLINIC | Facility: CLINIC | Age: 53
End: 2023-08-29
Payer: COMMERCIAL

## 2023-08-29 ENCOUNTER — TELEPHONE (OUTPATIENT)
Dept: INTERNAL MEDICINE CLINIC | Facility: CLINIC | Age: 53
End: 2023-08-29

## 2023-08-29 VITALS
BODY MASS INDEX: 29.35 KG/M2 | SYSTOLIC BLOOD PRESSURE: 102 MMHG | HEART RATE: 60 BPM | OXYGEN SATURATION: 98 % | RESPIRATION RATE: 19 BRPM | HEIGHT: 67 IN | WEIGHT: 187 LBS | DIASTOLIC BLOOD PRESSURE: 60 MMHG | TEMPERATURE: 98 F

## 2023-08-29 DIAGNOSIS — N64.89 COMPLEX SCLEROSING LESION OF LEFT BREAST: ICD-10-CM

## 2023-08-29 DIAGNOSIS — I45.81 LONG QT SYNDROME TYPE 1: ICD-10-CM

## 2023-08-29 DIAGNOSIS — Z01.818 PREOP EXAM FOR INTERNAL MEDICINE: Primary | ICD-10-CM

## 2023-08-29 PROCEDURE — 99213 OFFICE O/P EST LOW 20 MIN: CPT | Performed by: NURSE PRACTITIONER

## 2023-08-29 PROCEDURE — 3078F DIAST BP <80 MM HG: CPT | Performed by: NURSE PRACTITIONER

## 2023-08-29 PROCEDURE — 3074F SYST BP LT 130 MM HG: CPT | Performed by: NURSE PRACTITIONER

## 2023-08-29 PROCEDURE — 3008F BODY MASS INDEX DOCD: CPT | Performed by: NURSE PRACTITIONER

## 2023-08-30 ENCOUNTER — TELEPHONE (OUTPATIENT)
Dept: SURGERY | Facility: CLINIC | Age: 53
End: 2023-08-30

## 2023-08-31 ENCOUNTER — PATIENT MESSAGE (OUTPATIENT)
Dept: SURGERY | Facility: CLINIC | Age: 53
End: 2023-08-31

## 2023-08-31 PROBLEM — N64.89 COMPLEX SCLEROSING LESION OF LEFT BREAST: Status: ACTIVE | Noted: 2023-08-31

## 2023-09-05 ENCOUNTER — TELEPHONE (OUTPATIENT)
Dept: INTERNAL MEDICINE CLINIC | Facility: CLINIC | Age: 53
End: 2023-09-05

## 2023-09-05 ENCOUNTER — TELEPHONE (OUTPATIENT)
Dept: SURGERY | Facility: CLINIC | Age: 53
End: 2023-09-05

## 2023-09-05 DIAGNOSIS — N64.89 COMPLEX SCLEROSING LESION OF LEFT BREAST: Primary | ICD-10-CM

## 2023-09-05 DIAGNOSIS — Z01.818 PREOP EXAM FOR INTERNAL MEDICINE: Primary | ICD-10-CM

## 2023-09-05 NOTE — TELEPHONE ENCOUNTER
Calling pt in regards to scheduling surgery. Informed pt that I have 10/05/2023 available at [facility] with Dr. Shahrzad Griffin. Pt verbalized understanding and in agreement with date and location. All questions answered. Encouraged pt to call or ITN message office with any other questions or concerns.

## 2023-09-05 NOTE — TELEPHONE ENCOUNTER
BMP ordered. EKG needs to be ordered by cards. They did do one of April of this year that is in care everywhere. Please let their cards office know.

## 2023-09-05 NOTE — TELEPHONE ENCOUNTER
Spoke to [facility] cardiology, she states there is a letter from cardiologist clearing patient for surgery, they will fax clearance letter to our office (did not have 's fax # at the time) and I will forward to [facility]. They did not complete EKG. BMP order pended, thanks!

## 2023-09-06 ENCOUNTER — TELEPHONE (OUTPATIENT)
Dept: INTERNAL MEDICINE CLINIC | Facility: CLINIC | Age: 53
End: 2023-09-06

## 2023-09-06 NOTE — TELEPHONE ENCOUNTER
Incoming (mail or fax):   Fax  Received from:  [facility]  Documentation given to:  Triage

## 2023-09-06 NOTE — TELEPHONE ENCOUNTER
Cardiac clearance faxed to 's office, fax confirmed. Cardiac clearance letter in completed pre-op folder. Spoke with pt's cardiolgist and advised surgeon needs EKG prior to surgery, they will place order for pt. Message sent to pt notifying of cardiac clearance, bmp order and EKG order from cards.

## 2023-09-07 ENCOUNTER — TELEPHONE (OUTPATIENT)
Dept: SURGERY | Facility: CLINIC | Age: 53
End: 2023-09-07

## 2023-09-08 ENCOUNTER — TELEPHONE (OUTPATIENT)
Dept: MAMMOGRAPHY | Facility: HOSPITAL | Age: 53
End: 2023-09-08

## 2023-09-08 NOTE — TELEPHONE ENCOUNTER
Phoned K[facility] Beth regarding needle localization process of left breast for  excisional biopsy scheduled for 10-5-23 with Dr. Phoebe Staples. Procedure explained and all questions answered. Pt to be transported via W/C through [facility] to [facility] in [place]. Pt verbalized understanding and had no further questions at this time.

## 2023-09-13 ENCOUNTER — LAB ENCOUNTER (OUTPATIENT)
Dept: LAB | Facility: HOSPITAL | Age: 53
End: 2023-09-13
Attending: NURSE PRACTITIONER
Payer: COMMERCIAL

## 2023-09-13 DIAGNOSIS — Z01.818 PREOP EXAM FOR INTERNAL MEDICINE: ICD-10-CM

## 2023-09-13 DIAGNOSIS — Z01.818 PRE-OP TESTING: ICD-10-CM

## 2023-09-13 LAB
ANION GAP SERPL CALC-SCNC: 3 MMOL/L (ref 0–18)
BUN BLD-MCNC: 16 MG/DL (ref 7–18)
CALCIUM BLD-MCNC: 9.3 MG/DL (ref 8.5–10.1)
CHLORIDE SERPL-SCNC: 104 MMOL/L (ref 98–112)
CO2 SERPL-SCNC: 31 MMOL/L (ref 21–32)
CREAT BLD-MCNC: 0.94 MG/DL
EGFRCR SERPLBLD CKD-EPI 2021: 74 ML/MIN/1.73M2 (ref 60–?)
FASTING STATUS PATIENT QL REPORTED: YES
GLUCOSE BLD-MCNC: 94 MG/DL (ref 70–99)
OSMOLALITY SERPL CALC.SUM OF ELEC: 287 MOSM/KG (ref 275–295)
POTASSIUM SERPL-SCNC: 4.4 MMOL/L (ref 3.5–5.1)
SODIUM SERPL-SCNC: 138 MMOL/L (ref 136–145)

## 2023-09-13 PROCEDURE — 80048 BASIC METABOLIC PNL TOTAL CA: CPT

## 2023-09-13 PROCEDURE — 93005 ELECTROCARDIOGRAM TRACING: CPT

## 2023-09-13 PROCEDURE — 93010 ELECTROCARDIOGRAM REPORT: CPT | Performed by: INTERNAL MEDICINE

## 2023-09-13 PROCEDURE — 36415 COLL VENOUS BLD VENIPUNCTURE: CPT

## 2023-09-14 LAB
ATRIAL RATE: 53 BPM
P AXIS: 55 DEGREES
P-R INTERVAL: 170 MS
Q-T INTERVAL: 530 MS
QRS DURATION: 92 MS
QTC CALCULATION (BEZET): 497 MS
R AXIS: 45 DEGREES
T AXIS: 62 DEGREES
VENTRICULAR RATE: 53 BPM

## 2023-10-05 ENCOUNTER — APPOINTMENT (OUTPATIENT)
Dept: MAMMOGRAPHY | Facility: HOSPITAL | Age: 53
End: 2023-10-05
Attending: SURGERY
Payer: COMMERCIAL

## 2023-10-05 ENCOUNTER — ANESTHESIA EVENT (OUTPATIENT)
Dept: SURGERY | Facility: HOSPITAL | Age: 53
End: 2023-10-05
Payer: COMMERCIAL

## 2023-10-05 ENCOUNTER — ANESTHESIA (OUTPATIENT)
Dept: SURGERY | Facility: HOSPITAL | Age: 53
End: 2023-10-05
Payer: COMMERCIAL

## 2023-10-05 ENCOUNTER — HOSPITAL ENCOUNTER (OUTPATIENT)
Dept: MAMMOGRAPHY | Facility: HOSPITAL | Age: 53
Discharge: HOME | End: 2023-10-05
Attending: SURGERY | Admitting: SURGERY
Payer: COMMERCIAL

## 2023-10-05 ENCOUNTER — HOSPITAL ENCOUNTER (OUTPATIENT)
Facility: HOSPITAL | Age: 53
Setting detail: HOSPITAL OUTPATIENT SURGERY
Discharge: HOME | End: 2023-10-05
Attending: SURGERY | Admitting: SURGERY
Payer: COMMERCIAL

## 2023-10-05 VITALS
RESPIRATION RATE: 18 BRPM | TEMPERATURE: 97 F | WEIGHT: 189 LBS | DIASTOLIC BLOOD PRESSURE: 71 MMHG | HEIGHT: 67 IN | BODY MASS INDEX: 29.66 KG/M2 | OXYGEN SATURATION: 100 % | SYSTOLIC BLOOD PRESSURE: 109 MMHG | HEART RATE: 57 BPM

## 2023-10-05 DIAGNOSIS — Z01.818 PRE-OP TESTING: Primary | ICD-10-CM

## 2023-10-05 DIAGNOSIS — N64.89 COMPLEX SCLEROSING LESION OF LEFT BREAST: ICD-10-CM

## 2023-10-05 LAB — B-HCG UR QL: NEGATIVE

## 2023-10-05 PROCEDURE — 77065 DX MAMMO INCL CAD UNI: CPT | Performed by: SURGERY

## 2023-10-05 PROCEDURE — 76098 X-RAY EXAM SURGICAL SPECIMEN: CPT | Performed by: SURGERY

## 2023-10-05 PROCEDURE — 19285 PERQ DEV BREAST 1ST US IMAG: CPT | Performed by: SURGERY

## 2023-10-05 PROCEDURE — 0HBU0ZZ EXCISION OF LEFT BREAST, OPEN APPROACH: ICD-10-PCS | Performed by: SURGERY

## 2023-10-05 PROCEDURE — 88307 TISSUE EXAM BY PATHOLOGIST: CPT | Performed by: SURGERY

## 2023-10-05 PROCEDURE — 81025 URINE PREGNANCY TEST: CPT

## 2023-10-05 RX ORDER — LIDOCAINE HYDROCHLORIDE 10 MG/ML
INJECTION, SOLUTION EPIDURAL; INFILTRATION; INTRACAUDAL; PERINEURAL AS NEEDED
Status: DISCONTINUED | OUTPATIENT
Start: 2023-10-05 | End: 2023-10-05 | Stop reason: SURG

## 2023-10-05 RX ORDER — SCOLOPAMINE TRANSDERMAL SYSTEM 1 MG/1
1 PATCH, EXTENDED RELEASE TRANSDERMAL ONCE
Status: DISCONTINUED | OUTPATIENT
Start: 2023-10-05 | End: 2023-10-05 | Stop reason: HOSPADM

## 2023-10-05 RX ORDER — HYDROCODONE BITARTRATE AND ACETAMINOPHEN 5; 325 MG/1; MG/1
1 TABLET ORAL ONCE AS NEEDED
Status: DISCONTINUED | OUTPATIENT
Start: 2023-10-05 | End: 2023-10-05

## 2023-10-05 RX ORDER — EPHEDRINE SULFATE 50 MG/ML
INJECTION INTRAVENOUS AS NEEDED
Status: DISCONTINUED | OUTPATIENT
Start: 2023-10-05 | End: 2023-10-05 | Stop reason: SURG

## 2023-10-05 RX ORDER — HYDROMORPHONE HYDROCHLORIDE 1 MG/ML
0.2 INJECTION, SOLUTION INTRAMUSCULAR; INTRAVENOUS; SUBCUTANEOUS EVERY 5 MIN PRN
Status: DISCONTINUED | OUTPATIENT
Start: 2023-10-05 | End: 2023-10-05

## 2023-10-05 RX ORDER — METOPROLOL TARTRATE 5 MG/5ML
2.5 INJECTION INTRAVENOUS ONCE
Status: DISCONTINUED | OUTPATIENT
Start: 2023-10-05 | End: 2023-10-05

## 2023-10-05 RX ORDER — CEFAZOLIN SODIUM/WATER 2 G/20 ML
2 SYRINGE (ML) INTRAVENOUS ONCE
Status: COMPLETED | OUTPATIENT
Start: 2023-10-05 | End: 2023-10-05

## 2023-10-05 RX ORDER — SODIUM CHLORIDE, SODIUM LACTATE, POTASSIUM CHLORIDE, CALCIUM CHLORIDE 600; 310; 30; 20 MG/100ML; MG/100ML; MG/100ML; MG/100ML
INJECTION, SOLUTION INTRAVENOUS CONTINUOUS
Status: DISCONTINUED | OUTPATIENT
Start: 2023-10-05 | End: 2023-10-05

## 2023-10-05 RX ORDER — MIDAZOLAM HYDROCHLORIDE 1 MG/ML
INJECTION INTRAMUSCULAR; INTRAVENOUS AS NEEDED
Status: DISCONTINUED | OUTPATIENT
Start: 2023-10-05 | End: 2023-10-05 | Stop reason: SURG

## 2023-10-05 RX ORDER — PROCHLORPERAZINE EDISYLATE 5 MG/ML
5 INJECTION INTRAMUSCULAR; INTRAVENOUS EVERY 8 HOURS PRN
Status: DISCONTINUED | OUTPATIENT
Start: 2023-10-05 | End: 2023-10-05

## 2023-10-05 RX ORDER — HYDROCODONE BITARTRATE AND ACETAMINOPHEN 5; 325 MG/1; MG/1
1-2 TABLET ORAL EVERY 6 HOURS PRN
Qty: 20 TABLET | Refills: 0 | Status: SHIPPED | OUTPATIENT
Start: 2023-10-05 | End: 2023-10-12 | Stop reason: ALTCHOICE

## 2023-10-05 RX ORDER — CEFAZOLIN SODIUM/WATER 2 G/20 ML
SYRINGE (ML) INTRAVENOUS
Status: DISCONTINUED
Start: 2023-10-05 | End: 2023-10-05

## 2023-10-05 RX ORDER — ACETAMINOPHEN 500 MG
1000 TABLET ORAL ONCE
Status: DISCONTINUED | OUTPATIENT
Start: 2023-10-05 | End: 2023-10-05 | Stop reason: HOSPADM

## 2023-10-05 RX ORDER — ACETAMINOPHEN 500 MG
1000 TABLET ORAL ONCE AS NEEDED
Status: DISCONTINUED | OUTPATIENT
Start: 2023-10-05 | End: 2023-10-05

## 2023-10-05 RX ORDER — DIAZEPAM 5 MG/1
5 TABLET ORAL AS NEEDED
Status: DISCONTINUED | OUTPATIENT
Start: 2023-10-05 | End: 2023-10-05 | Stop reason: HOSPADM

## 2023-10-05 RX ORDER — DEXAMETHASONE SODIUM PHOSPHATE 4 MG/ML
VIAL (ML) INJECTION AS NEEDED
Status: DISCONTINUED | OUTPATIENT
Start: 2023-10-05 | End: 2023-10-05 | Stop reason: SURG

## 2023-10-05 RX ORDER — LIDOCAINE HYDROCHLORIDE AND EPINEPHRINE 10; 10 MG/ML; UG/ML
INJECTION, SOLUTION INFILTRATION; PERINEURAL AS NEEDED
Status: DISCONTINUED | OUTPATIENT
Start: 2023-10-05 | End: 2023-10-05 | Stop reason: HOSPADM

## 2023-10-05 RX ORDER — HYDROCODONE BITARTRATE AND ACETAMINOPHEN 5; 325 MG/1; MG/1
2 TABLET ORAL ONCE AS NEEDED
Status: DISCONTINUED | OUTPATIENT
Start: 2023-10-05 | End: 2023-10-05

## 2023-10-05 RX ORDER — HYDROMORPHONE HYDROCHLORIDE 1 MG/ML
0.4 INJECTION, SOLUTION INTRAMUSCULAR; INTRAVENOUS; SUBCUTANEOUS EVERY 5 MIN PRN
Status: DISCONTINUED | OUTPATIENT
Start: 2023-10-05 | End: 2023-10-05

## 2023-10-05 RX ORDER — HYDROMORPHONE HYDROCHLORIDE 1 MG/ML
0.6 INJECTION, SOLUTION INTRAMUSCULAR; INTRAVENOUS; SUBCUTANEOUS EVERY 5 MIN PRN
Status: DISCONTINUED | OUTPATIENT
Start: 2023-10-05 | End: 2023-10-05

## 2023-10-05 RX ORDER — NALOXONE HYDROCHLORIDE 0.4 MG/ML
80 INJECTION, SOLUTION INTRAMUSCULAR; INTRAVENOUS; SUBCUTANEOUS AS NEEDED
Status: DISCONTINUED | OUTPATIENT
Start: 2023-10-05 | End: 2023-10-05

## 2023-10-05 RX ORDER — MIDAZOLAM HYDROCHLORIDE 1 MG/ML
1 INJECTION INTRAMUSCULAR; INTRAVENOUS EVERY 5 MIN PRN
Status: DISCONTINUED | OUTPATIENT
Start: 2023-10-05 | End: 2023-10-05

## 2023-10-05 RX ORDER — BUPIVACAINE HYDROCHLORIDE 5 MG/ML
INJECTION, SOLUTION EPIDURAL; INTRACAUDAL AS NEEDED
Status: DISCONTINUED | OUTPATIENT
Start: 2023-10-05 | End: 2023-10-05 | Stop reason: HOSPADM

## 2023-10-05 RX ADMIN — DEXAMETHASONE SODIUM PHOSPHATE 4 MG: 4 MG/ML VIAL (ML) INJECTION at 10:47:00

## 2023-10-05 RX ADMIN — EPHEDRINE SULFATE 10 MG: 50 INJECTION INTRAVENOUS at 10:55:00

## 2023-10-05 RX ADMIN — CEFAZOLIN SODIUM/WATER 2 G: 2 G/20 ML SYRINGE (ML) INTRAVENOUS at 10:44:00

## 2023-10-05 RX ADMIN — LIDOCAINE HYDROCHLORIDE 50 MG: 10 INJECTION, SOLUTION EPIDURAL; INFILTRATION; INTRACAUDAL; PERINEURAL at 10:42:00

## 2023-10-05 RX ADMIN — MIDAZOLAM HYDROCHLORIDE 1 MG: 1 INJECTION INTRAMUSCULAR; INTRAVENOUS at 10:39:00

## 2023-10-05 NOTE — IMAGING NOTE
Assisted Dr. Sugey Bloom with ultrasound guided needle localization of left breast for excisional biopsy with Dr Bernadette Ross. Procedure explained and all questions answered. Pt verbalized understanding and tolerated procedure well with minimal discomfort. Time out was done with all team members at 5855, patient was prepped and local injected by Dr Sugey Bloom. Bobo Wire placed by Dr Sugey Bloom and secured with sterisrtips, 4x4 gauze and Tegaderm dressing. Patient [facility] transferred care to [facility] for post mammogram in stable condition. Pt transported to OR holding via W/C with wire intact.

## 2023-10-05 NOTE — OPERATIVE REPORT
[facility]    PATIENT'S NAME: Lubna Muñoz   ATTENDING PHYSICIAN: Denita Reyes M.D. OPERATING PHYSICIAN: Denita Reyes M.D. PATIENT ACCOUNT#:   [de-identified]    LOCATION:  PRE\[facility] [place] [facility]\""SC [facility] PRE [facility] 21 EDWP 10  MEDICAL RECORD #:   [id number]       YOB: 1970  ADMISSION DATE:       10/05/2023      OPERATION DATE:  10/05/2023    OPERATIVE REPORT    PREOPERATIVE DIAGNOSIS:  Sclerosing lesion of left breast.  POSTOPERATIVE DIAGNOSIS:  Sclerosing lesion of left breast.  PROCEDURE:  Left breast wire-localized lumpectomy with left breast specimen radiography. ASSISTANT:  Vernell Martinez CSA. ANESTHESIA:  Monitored anesthesia care and local.    ESTIMATED BLOOD LOSS:  5 mL. DRAINS:  None. COMPLICATIONS:  None. DISPOSITION:  Stable on transfer to recovery room. INDICATIONS:  The patient is a 51-year-old female who had an indeterminate distortion found in her left breast and biopsy confirmed complex sclerosing lesion. We discussed possible association with atypia, and to exclude concerning pathological association as well as to remove the remainder of the lesion I recommended formal surgical excision. Risks and possible complications were discussed with the patient, including, but not limited to, infection, bleeding, injury to surrounding structures, possible need for reoperation, and she agreed to the proposed surgery. OPERATIVE TECHNIQUE:  The patient was brought to the imaging suite. She underwent a wire localization of the area of concern in the left breast.  She was then brought to the OR, placed in a  supine position, properly padded and secured, given a dose of IV antibiotics, and sequential compression devices were applied to her legs for DVT prophylaxis. Monitored anesthesia care was induced.   The left breast was then prepped and draped in the usual sterile fashion, and 1% lidocaine with epinephrine was used to infiltrate the skin and subcutaneous tissues at the targeted incision site. An incision was completed with a 15-blade knife along the medial superior periareolar border. Wire was identified, brought into the field, and a segment of breast tissue surrounding the tip of the wire was carefully excised. It was oriented with a short stitch and single clip superiorly, a long stitch and double clip laterally in order to allow for appropriate pathological margin specimen review. It was then placed in the imaging device where specimen x-ray confirmed the presence of targeted clip residual area with adequate margins as deemed by myself, and a clip was then placed back within the cavity to assist with subsequent surveillance. The wound was irrigated. Hemostasis assured with electrocautery. The deep tissue reapproximated with a running 3-0 Vicryl suture. The wound was then closed with an interrupted 3-0 Vicryl for deep layer and a running 4-0 subcuticular Monocryl for the skin. Mastisol and Steri-Strips were applied. Marcaine 0.5% was instilled in the cavity to assist with postoperative analgesia. A sterile dressing and compression bra were placed. Blood loss was minimal.  All counts were correct at the conclusion of the procedure. She tolerated the procedure well. She was transferred to the recovery area in stable condition. Dictated By Efrem Garcia.  Jill Clay M.D.  d: 10/05/2023 11:38:37  t: 10/05/2023 14:35:47  Job [id number]  CMG/    cc: RIDDHI Poole APN

## 2023-10-05 NOTE — BRIEF OP NOTE
Pre-Operative Diagnosis: Complex sclerosing lesion of left breast [N64.89]     Post-Operative Diagnosis: Complex sclerosing lesion of left breast [N64.89]      Procedure Performed:   Left breast wire localized excisional biopsy    Surgeon(s) and Role:     Roby Duenas MD - Primary    Assistant(s):  Surgical Assistant.: Kannan Holland CSA     Surgical Findings: Clip seen on xray     Specimen: L lumpectomy     Estimated Blood Loss: Blood Output: 5 mL (10/5/2023 11:04 AM)    Mariposa Zafar MD  10/5/2023  11:09 AM

## 2023-10-09 ENCOUNTER — APPOINTMENT (OUTPATIENT)
Dept: LAB | Age: 53
End: 2023-10-09
Attending: INTERNAL MEDICINE

## 2023-10-12 ENCOUNTER — OFFICE VISIT (OUTPATIENT)
Dept: SURGERY | Facility: CLINIC | Age: 53
End: 2023-10-12
Payer: COMMERCIAL

## 2023-10-12 VITALS
TEMPERATURE: 98 F | RESPIRATION RATE: 18 BRPM | OXYGEN SATURATION: 97 % | SYSTOLIC BLOOD PRESSURE: 115 MMHG | DIASTOLIC BLOOD PRESSURE: 78 MMHG | HEART RATE: 60 BPM | BODY MASS INDEX: 29.66 KG/M2 | HEIGHT: 67 IN | WEIGHT: 189 LBS

## 2023-10-12 DIAGNOSIS — N64.89 COMPLEX SCLEROSING LESION OF LEFT BREAST: Primary | ICD-10-CM

## 2023-10-12 PROCEDURE — 99024 POSTOP FOLLOW-UP VISIT: CPT

## 2023-10-12 PROCEDURE — 3008F BODY MASS INDEX DOCD: CPT

## 2023-10-12 PROCEDURE — 3074F SYST BP LT 130 MM HG: CPT

## 2023-10-12 PROCEDURE — 3078F DIAST BP <80 MM HG: CPT

## 2023-10-17 ENCOUNTER — OFFICE VISIT (OUTPATIENT)
Dept: SURGERY | Facility: CLINIC | Age: 53
End: 2023-10-17
Payer: COMMERCIAL

## 2023-10-17 VITALS
SYSTOLIC BLOOD PRESSURE: 108 MMHG | BODY MASS INDEX: 30 KG/M2 | DIASTOLIC BLOOD PRESSURE: 73 MMHG | OXYGEN SATURATION: 97 % | WEIGHT: 190.19 LBS | HEART RATE: 61 BPM | RESPIRATION RATE: 17 BRPM | TEMPERATURE: 97 F

## 2023-10-17 DIAGNOSIS — Z12.31 SCREENING MAMMOGRAM FOR BREAST CANCER: Primary | ICD-10-CM

## 2023-10-17 DIAGNOSIS — N64.89 COMPLEX SCLEROSING LESION OF LEFT BREAST: ICD-10-CM

## 2023-10-17 PROCEDURE — 3074F SYST BP LT 130 MM HG: CPT | Performed by: SURGERY

## 2023-10-17 PROCEDURE — 99024 POSTOP FOLLOW-UP VISIT: CPT | Performed by: SURGERY

## 2023-10-17 PROCEDURE — 3078F DIAST BP <80 MM HG: CPT | Performed by: SURGERY

## 2024-06-16 NOTE — PROGRESS NOTES
[facility]    CHIEF COMPLAINT:   Chief Complaint   Patient presents with    Routine Physical     5/18/22-normal pap, neg HPV. 7/17/23-dx mammo. 10/9/23-neg FIT         HPI:   Dee Villa is a 53 year old female who presents for a complete physical exam. Symptoms: denies discharge, itching, burning or dysuria.     Pap done 5/2022 negative with  negative hpv.   Mammo scheduled for July. Ordered by dr. Bettnecourt.   Colonoscopy not done. Fit done 10/2023 was negative. She is not sure if she wants to do the colonoscopy. Discussed benefits.     Up to date tdap, prevnar 20 (smoker).   Due covid booster. Declines.     Exercise: health club but not regular, has not been in a month.     Derm: she needs a referral to derm. Has numerous moles.     She had a left breast complex sclerosing lesion in 8/2023 and had excision of this in 10/2023. Now seeing dr. Bettencourt.     Has long QT syndrome. Seeing cardiology. On nadolol. No symptoms.         Wt Readings from Last 6 Encounters:   06/17/24 191 lb (86.6 kg)   10/17/23 190 lb 3.2 oz (86.3 kg)   10/12/23 189 lb (85.7 kg)   10/05/23 189 lb (85.7 kg)   08/29/23 187 lb (84.8 kg)   08/28/23 188 lb 3.2 oz (85.4 kg)     Body mass index is 30.37 kg/m².       Current Outpatient Medications   Medication Sig Dispense Refill    NADOLOL 40 MG Oral Tab TAKE 1 TABLET(40 MG) BY MOUTH DAILY 90 tablet 3    Multiple Vitamins-Minerals (MULTIVITAMIN OR) Take by mouth daily.          Past Medical History:    Contusion of knee    Diverticulitis    Dysmenorrhea    Left ovarian cyst    Lipid screening    Lumbar strain    she injured her back about a week ago while lifting a child for about an hour and half    Lump in chest    mass near the sternal notch likely a lipoma    Right ovarian cyst    Uterine fibroid    two anterior-inferior uterine body fibroids    Visual impairment    contacts and glasses      Past Surgical History:   Procedure Laterality Date    Romina localization wire 1 site left  (cpt=19281) Left 1994    mastitis    Romina localization wire 1 site right (cpt=19281) Right 1994    mastitis    Upper arm/elbow surgery unlisted  1997    left elbow for osteomyelitis      Family History   Problem Relation Age of Onset    Hypertension Mother     Cancer Paternal Grandmother         liver cancer?    Hypertension Father     Other (MI) Father 74      Social History:   Social History     Socioeconomic History    Marital status:    Occupational History    Occupation: XRAY TECH     Employer: [employer]   Tobacco Use    Smoking status: Former     Types: Cigarettes    Smokeless tobacco: Never    Tobacco comments:     QUIT IN 1/2015, 2 CIGS A DAY FOR 20 YEARS   Vaping Use    Vaping status: Never Used   Substance and Sexual Activity    Alcohol use: Yes     Comment: approx 1 drink per week    Drug use: No   Other Topics Concern    Caffeine Concern Yes     Comment: 2 cups of coffee in am, 1 tea in evening, occasional coke at lunch    Exercise Yes     Comment: 1 time per week ice skating     Occ: xray tech. : yes.    Exercise: walking.  Diet: watches minimally     REVIEW OF SYSTEMS:   GENERAL: feels well otherwise  SKIN: denies any unusual skin lesions  EYES:denies blurred vision or double vision  HEENT: denies nasal congestion, sinus pain or ST  LUNGS: denies shortness of breath with exertion  CARDIOVASCULAR: denies chest pain on exertion  GI: denies abdominal pain,denies heartburn  : denies dysuria, vaginal discharge or itching  MUSCULOSKELETAL: denies back pain  NEURO: denies headaches  PSYCHE: denies depression or anxiety  HEMATOLOGIC: denies hx of anemia  ENDOCRINE: denies thyroid history  ALL/ASTHMA: denies hx of allergy or asthma    EXAM:   /70 (BP Location: Left arm, Patient Position: Sitting, Cuff Size: large)   Pulse 64   Temp 97.2 °F (36.2 °C) (Temporal)   Resp 12   Ht 5' 6.5\" (1.689 m)   Wt 191 lb (86.6 kg)   LMP 03/15/2022   Breastfeeding No   BMI 30.37 kg/m²    Body mass index is 30.37 kg/m².   GENERAL: well developed, well nourished,in no apparent distress  SKIN: no rashes,no suspicious lesions  HEENT: atraumatic, normocephalic,ears and throat are clear  EYES:PERRLA, conjunctiva are clear  NECK: supple,no adenopathy,no bruits  CHEST: no chest tenderness  LUNGS: clear to auscultation  CARDIO: nl s1 and s2, RRR without murmur  GI: good BS's,no masses, HSM or tenderness  BREAST: no dominant or suspicious mass, no nipple discharge  GENITAL/URINARY:  External Genitalia:  General appearance; normal, Hair distribution; normal, Lesions absent, Urethral Meatus:  Size normal, Location normal, Lesions absent, Prolapse absent, Vagina:  General appearance normal, Lesions absent, has some vaginal prolapse, Cervix:  General appearance normal, Discharge absent, Tenderness absent, Enlargement absent, Uterus:  Masses absent, Adnexa:  Masses absent, Tenderness absent, Anus/Perineum:  Lesions absent and Masses absent  No pap.   MUSCULOSKELETAL: back is not tender,FROM of the back  EXTREMITIES: no cyanosis, clubbing or edema  NEURO: Oriented times three,cranial nerves are intact,motor and sensory are grossly intact    Labs:   Lab Results   Component Value Date/Time    WBC 6.1 05/24/2022 08:41 AM    HGB 15.1 05/24/2022 08:41 AM    .0 05/24/2022 08:41 AM      Lab Results   Component Value Date/Time    GLU 94 09/13/2023 03:57 PM     09/13/2023 03:57 PM    K 4.4 09/13/2023 03:57 PM     09/13/2023 03:57 PM    CO2 31.0 09/13/2023 03:57 PM    CREATSERUM 0.94 09/13/2023 03:57 PM    CA 9.3 09/13/2023 03:57 PM    ALB 4.1 05/24/2022 08:41 AM    TP 7.3 05/24/2022 08:41 AM    ALKPHO 58 05/24/2022 08:41 AM    AST 14 (L) 05/24/2022 08:41 AM    ALT 18 05/24/2022 08:41 AM    BILT 0.7 05/24/2022 08:41 AM    TSH 3.530 05/24/2022 08:41 AM        Lab Results   Component Value Date/Time    CHOLEST 155 05/24/2022 08:41 AM    HDL 40 05/24/2022 08:41 AM    TRIG 255 (H) 05/24/2022 08:41 AM    LDL  73 05/24/2022 08:41 AM    NONHDLC 115 05/24/2022 08:41 AM       Lab Results   Component Value Date/Time    A1C 6.0 (H) 05/24/2022 08:41 AM      Vitamin D:    No results found for: \"VITD\"        ASSESSMENT AND PLAN:   Dee Villa is a 53 year old female who presents for a complete physical exam.   1. Physical exam, annual  Do labs.   Pelvic done, breast exam done.   Mammo per dr. Bettencourt.   Urged to do colonoscopy but fit card given as well if she doesn't do.   Immunizations discussed.   Urged regular exercise.   - CBC With Differential With Platelet; Future  - Comp Metabolic Panel; Future  - Lipid Panel; Future  - Hemoglobin A1C; Future  - TSH W Reflex To Free T4; Future    2. Screening for colon cancer  - Occult Blood, Fecal, Immunoassay (Blue cards) [E]; Future  - EVALUATE & TREAT, GASTRO (INTERNAL)    3. Screening for endocrine, metabolic and immunity disorder  - Hemoglobin A1C; Future    4. Vaginal prolapse  Trial pelvic floor PT.   - Physical Therapy Referral - [place] Location    5. Numerous moles  - Derm Referral - In Network        Return in about 1 year (around 6/17/2025) for physical.      Radha Siegel MD

## 2024-06-17 ENCOUNTER — OFFICE VISIT (OUTPATIENT)
Dept: INTERNAL MEDICINE CLINIC | Facility: CLINIC | Age: 54
End: 2024-06-17
Payer: COMMERCIAL

## 2024-06-17 VITALS
DIASTOLIC BLOOD PRESSURE: 70 MMHG | WEIGHT: 191 LBS | SYSTOLIC BLOOD PRESSURE: 104 MMHG | BODY MASS INDEX: 30.34 KG/M2 | HEART RATE: 64 BPM | RESPIRATION RATE: 12 BRPM | HEIGHT: 66.5 IN | TEMPERATURE: 97 F

## 2024-06-17 DIAGNOSIS — Z13.0 SCREENING FOR ENDOCRINE, METABOLIC AND IMMUNITY DISORDER: ICD-10-CM

## 2024-06-17 DIAGNOSIS — Z13.228 SCREENING FOR ENDOCRINE, METABOLIC AND IMMUNITY DISORDER: ICD-10-CM

## 2024-06-17 DIAGNOSIS — N81.10 VAGINAL PROLAPSE: ICD-10-CM

## 2024-06-17 DIAGNOSIS — Z13.29 SCREENING FOR ENDOCRINE, METABOLIC AND IMMUNITY DISORDER: ICD-10-CM

## 2024-06-17 DIAGNOSIS — Z00.00 PHYSICAL EXAM, ANNUAL: Primary | ICD-10-CM

## 2024-06-17 DIAGNOSIS — Z12.11 SCREENING FOR COLON CANCER: ICD-10-CM

## 2024-06-17 DIAGNOSIS — D22.9 NUMEROUS MOLES: ICD-10-CM

## 2024-06-17 PROCEDURE — 3078F DIAST BP <80 MM HG: CPT | Performed by: INTERNAL MEDICINE

## 2024-06-17 PROCEDURE — 99396 PREV VISIT EST AGE 40-64: CPT | Performed by: INTERNAL MEDICINE

## 2024-06-17 PROCEDURE — 3074F SYST BP LT 130 MM HG: CPT | Performed by: INTERNAL MEDICINE

## 2024-06-17 PROCEDURE — 3008F BODY MASS INDEX DOCD: CPT | Performed by: INTERNAL MEDICINE

## 2024-07-11 ENCOUNTER — LAB REQUISITION (OUTPATIENT)
Dept: LAB | Facility: HOSPITAL | Age: 54
End: 2024-07-11
Payer: COMMERCIAL

## 2024-07-11 DIAGNOSIS — D48.5 NEOPLASM OF UNCERTAIN BEHAVIOR OF SKIN: ICD-10-CM

## 2024-07-11 PROCEDURE — 88305 TISSUE EXAM BY PATHOLOGIST: CPT | Performed by: PHYSICIAN ASSISTANT

## 2024-07-17 ENCOUNTER — HOSPITAL ENCOUNTER (OUTPATIENT)
Dept: MAMMOGRAPHY | Age: 54
Discharge: HOME | End: 2024-07-17
Attending: SURGERY
Payer: COMMERCIAL

## 2024-07-17 DIAGNOSIS — Z12.31 SCREENING MAMMOGRAM FOR BREAST CANCER: ICD-10-CM

## 2024-07-17 PROCEDURE — 77067 SCR MAMMO BI INCL CAD: CPT | Performed by: SURGERY

## 2024-07-17 PROCEDURE — 77063 BREAST TOMOSYNTHESIS BI: CPT | Performed by: SURGERY

## 2024-08-08 ENCOUNTER — TELEPHONE (OUTPATIENT)
Dept: PHYSICAL THERAPY | Facility: HOSPITAL | Age: 54
End: 2024-08-08

## 2024-08-13 ENCOUNTER — OFFICE VISIT (OUTPATIENT)
Dept: PHYSICAL THERAPY | Facility: HOSPITAL | Age: 54
End: 2024-08-13
Attending: INTERNAL MEDICINE
Payer: COMMERCIAL

## 2024-08-13 DIAGNOSIS — N81.10 VAGINAL PROLAPSE: Primary | ICD-10-CM

## 2024-08-13 PROCEDURE — 97112 NEUROMUSCULAR REEDUCATION: CPT

## 2024-08-13 PROCEDURE — 97110 THERAPEUTIC EXERCISES: CPT

## 2024-08-13 PROCEDURE — 97161 PT EVAL LOW COMPLEX 20 MIN: CPT

## 2024-08-13 NOTE — PROGRESS NOTES
MUSCULOSKELETAL AND PELVIC FLOOR EVALUATION:     Diagnosis:   Vaginal prolapse (N81.10)       Referring Provider: Radha Siegel  Date of Evaluation:    2024    Precautions:  None Next MD visit:   none scheduled  Date of Surgery: n/a     PATIENT SUMMARY   Dee Villa is a 53 year old female  who presents to therapy today with complaints of prolapse with urine urgency and constipation. Some times doesn't feel like she fully empties bladder so goes multiple times in short amount of time and strains some times. Prolapse started after delivery of daughter who is 15 y/o-daughter was 9#2oz.  Current symptoms include: pressure, incomplete emptying, and constipation strains with bowel and and urine    Pt describes pain level: current 0/10, best 0/10, worst 0/10.     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 Pt goals include strengthen pelvic floor muscles and decrease prolapse.  Past medical history was reviewed with Dee. Significant findings include  has a past medical history of Contusion of knee (2011), Diverticulitis, Dysmenorrhea (2006), Left ovarian cyst (2007), Lipid screening (2011), Lumbar strain (2009), Lump in chest (2007), Right ovarian cyst (2011), Uterine fibroid (2007), and Visual impairment.     URINARY HABITS  Types of symptoms: incomplete emptying, nocturia, and other: urgency , strains some times to \"fully\" empty bladder  Events associated with the onset of urinary complaints: birth of children  Abdominal/Vaginal Pressure complaints: yes  Urinary Frequency: 5x/day but has urgency throughout the day  Urine Stream: strong  Amount: normal  Nocturia: 0-1x/night  Fluid Intake: water  Bladder irritants: 2 caff  Urine Stop test:  able to stop urine   Post void dribble: no  Hovering: usually no  Empty bladder just in case: some times  Do you ever leak urine without knowing it? no    BOWEL HABITS  Types of symptoms: Constipation   Frequency of bowel movements: 1x/day  Stool consistency: [place] Stool Scale: 1, 4  Do you strain with defecation: Yes   Laxative use: No    SEXUAL HEALTH STATUS  Sexual [place] Status: not active      ASSESSMENT  Dee presents to physical therapy evaluation with primary c/o pelvic organ prolapse. The results of the objective tests and measures show  decreased pelvic floor muscle strength and coordination, fair bowel and bladder habits, decreased core /hip strength .  Functional deficits include but are not limited to straining with voiding bladder and bowel.  Signs and symptoms are consistent with diagnosis of Vaginal prolapse (N81.10)  . Pt and PT discussed evaluation findings, pathology, POC and HEP.  Pt voiced understanding and performs HEP correctly without reported pain. Skilled Pelvic Physical Therapy is medically necessary to address the above impairments and reach functional goals.    OBJECTIVE:   Posture: rounded shoulders  Pelvic Alignment: symmetrical   Gait: pt ambulates on level ground with normal mechanics.     External Palpation: no tenderness  Scars (location/surgery): none  Abdominal Wall Integrity: no restrictions   Diastasis Recti: no    Range Of Motion  Lumbar AROM screen: wfl  LE AROM screen: grossly WNL     Strength (MMT) 5/5 COOPER LE except hip abd and ext 4/5  Transverse Abdominis: 1/5    Flexibility Summary: WNL COOPER LE except hamstrings -10 B 90/90    Informed consent for internal pelvic evaluation given: Yes    External Observation:   Voluntary contraction: present   Voluntary relaxation: present  Involuntary contraction: absent  Involuntary relaxation: present    Mons pubis: WNL  Labia majora: WNL  Labia minora: WNL  Urethral meatus: WNL  Introitus: WNL  Perineal body:  WNL    Sensory/Reflex:  Vestibule: normal bilaterally  Anal [place]: Not Tested    Internal Examination   Scar: none    Pelvic Floor Muscle strength: (PERF= Power/Endurance/Reps/Fast) MMT: 2/3/5/4  External Anal Sphincter: nt  Accessory Muscle Use: abdominals    Tissue Laxity Test:  Anterior Wall: Min  Posterior Wall: Mod  Apical: WNL    Eccentric lengthening contraction: wnl  Bearing down Valsalva maneuver (2-3x): + prolapse    Internal Palpation: WNL     Today's Treatment and Response:   Patient education was provided on objective findings of external and internal evaluation and expectations with treatment outcomes. Educated on pelvic anatomy and function with diagrams and pelvic model, bladder normatives, adequate hydration levels, proper toileting posture, coordination of diaphragmatic breathing and pelvic floor contraction , and knack/pelvic muscle brace    Manual therapy/NM Re-ed- internal retraining pelvic floor muscles to learn to perform diaphragmatic breathing , abdominal bracing , kegel    Sitting-retraining kegel, abdominal bracing , diaphragmatic breathing      Patient was instructed in and issued a HEP for: diaphragmatic breathing x10, abdominal bracing x10, Kegel 10 repetitions 3x/day, 10 sec on/10 sec off, 2 sec on 2-4 sec off     Charges: PT Eval Low Complexity, TE NM (MT included)     Total Timed Treatment: 30 min     Total Treatment Time: 45 min     PLAN OF CARE:    Goals: (to be met in 10 visits)  1. Independent in HEP and progression with improved understanding of bladder/bowel health, bladder retraining and long-term management.    2. Patient will demonstrate improved bladder /bowel voiding habits to voiding every 2 hours with less straining.  3. Patient will demonstrate improve PFM isolation, coordination and strength so she is able to reduce urinary urge and prevent leakage during ADL's.  4. Pt will have increased transverse abdominis muscle strength to 2/5 and hip strength to 5/5 to assist with  supporting pelvic floor muscles.   5. PFM contraction before increase intra-abdominal pressure.      Frequency / Duration: Patient will be seen for 1 x/week or a total of 10 visits over a 90 day period.  Treatment will include: Manual Therapy, Neuromuscular Re-education, Therapeutic Activities, Therapeutic Exercise, and Home Exercise Program instruction     Education or treatment limitation: None  Rehab Potential:good      Patient/Family/Caregiver was advised of these findings, precautions, and treatment options and has agreed to actively participate in planning and for this course of care.    Thank you for your referral. Please co-sign or sign and return this letter via fax as soon as possible to [phone]. If you have any questions, please contact me at Dept: [phone]    Sincerely,  Electronically signed by therapist: Kriss Em PT  Physician's certification required: Yes  I certify the need for these services furnished under this plan of treatment and while under my care.    X___________________________________________________ Date____________________    Certification From: 8/13/2024  To:11/11/2024

## 2024-08-20 ENCOUNTER — OFFICE VISIT (OUTPATIENT)
Dept: PHYSICAL THERAPY | Facility: HOSPITAL | Age: 54
End: 2024-08-20
Attending: INTERNAL MEDICINE
Payer: COMMERCIAL

## 2024-08-20 PROCEDURE — 97110 THERAPEUTIC EXERCISES: CPT

## 2024-08-20 PROCEDURE — 97112 NEUROMUSCULAR REEDUCATION: CPT

## 2024-08-20 NOTE — PROGRESS NOTES
Diagnosis:   Vaginal prolapse (N81.10)          Referring Provider: Radha Siegel  Date of Evaluation:    8/13/2024    Precautions:  None Next MD visit:   none scheduled  Date of Surgery: n/a   Insurance Primary/Secondary: [employer] [place] HMO / N/A     # Auth Visits: 8 visits 6/17-10/30            Subjective: Feels like pelvic floor muscles are getting stronger already. Trying to remember to do kegel's throughout the day.    Pain: 0/10      Objective: Tx flow sheet       Initial evaluation: w updates  URINARY HABITS  Types of symptoms: incomplete emptying, nocturia, and other: urgency, strains some times to \"fully\" empty bladder  Abdominal/Vaginal Pressure complaints: yes  Urinary Frequency: 5x/day but has urgency throughout the day  Empty bladder just in case: some times      BOWEL HABITS  Types of symptoms: Constipation   Frequency of bowel movements: 1x/day  Stool consistency: [place] Stool Scale: 1, 4  Do you strain with defecation: Yes     SEXUAL HEALTH STATUS  Sexual [place] Status: not active    Strength (MMT) 5/5 COOPER LE except hip abd and ext 4/5  Transverse Abdominis: 1/5    Flexibility Summary: WNL COOPER LE except hamstrings -10 B 90/90    Informed consent for internal pelvic evaluation given: Yes    External Observation:   Voluntary contraction: present   Voluntary relaxation: present  Involuntary contraction: absent  Involuntary relaxation: present    Mons pubis: WNL  Labia majora: WNL  Labia minora: WNL  Urethral meatus: WNL  Introitus: WNL  Perineal body: WNL    Sensory/Reflex:  Vestibule: normal bilaterally  Anal [place]: Not Tested    Internal Examination   Pelvic Floor Muscle strength: (PERF= Power/Endurance/Reps/Fast) MMT: 2/3/5/4  Accessory Muscle Use: abdominals    Tissue Laxity Test:  Anterior Wall: Min  Posterior Wall: Mod  Apical: WNL    Eccentric lengthening contraction: wnl  Bearing down Valsalva maneuver (2-3x): + prolapse    Internal Palpation: WNL       Assessment: Improved coordination of pelvic  floor muscles via fascially connected mm. Upgraded HEP. Pt able to progress pelvic brace with simulated activities of daily living with correct breathing. Noted more difficulty kicking out R LE when on ball. Pt notes that some times she has some weakness R>L hip but no pain. Pt education: Pressure management strategies w activities of daily living .      Goals:   Goals: (to be met in 10 visits)-progressing  1. Independent in HEP and progression with improved understanding of bladder/bowel health, bladder retraining and long-term management.    2. Patient will demonstrate improved bladder /bowel voiding habits to voiding every 2 hours with less straining.  3. Patient will demonstrate improve PFM isolation, coordination and strength so she is able to reduce urinary urge and prevent leakage during ADL's.  4. Pt will have increased transverse abdominis muscle strength to 2/5 and hip strength to 5/5 to assist with supporting pelvic floor muscles.   5. PFM contraction before increase intra-abdominal pressure.    Plan: Continue plan of care as pt tolerates with focus on pelvic floor muscles . Next visit: biofeedback   Date: 8/20/2024  TX#: 2/10  8 approved Date:                 TX#: 3/ Date:                 TX#: 4/ Date:                 TX#: 5/ Date:   Tx#: 6/   NM/TE    Fanny/phys pelvic floor muscles /diaphragm /Transverse abdominis /fascially connected mm education     Pt education: incorporating kegel's w activities of daily living     Pressure management strategies with visual handout    Retraining-  diaphragmatic breathing   abdominal bracing   Kegel    Kegel +  iso hip add  10\" x10     Kegel + articulating bridge x10 w isometric adduction     Kegel +  Resisted   ER RTB  x10     Kegel + clamshell RTB x10 R/L      Kegel +  U/LE lift  x10     Kegel with plie/ER resisted RTB x10     Issued HEP with GTB for above exercises    Sit on ball-  Kegel-tonic, phasic    Kegel +  U/LE lift  x10     Standing-  Kegel + iso hip add +  squat w ball behind back x20    NM/TE NM/TE NM/TE NM/TE   HEP: diaphragmatic breathing x10, abdominal bracing x10, Kegel 10 repetitions 3x/day, 10 sec on/10 sec off, 2 sec on 2-4 sec off , + additions above    Charges: NM 15  TE2 30       Total Timed Treatment: 45 min  Total Treatment Time: 45 min

## 2024-09-05 ENCOUNTER — OFFICE VISIT (OUTPATIENT)
Dept: PHYSICAL THERAPY | Facility: HOSPITAL | Age: 54
End: 2024-09-05
Attending: INTERNAL MEDICINE
Payer: COMMERCIAL

## 2024-09-05 PROCEDURE — 97110 THERAPEUTIC EXERCISES: CPT

## 2024-09-05 PROCEDURE — 97112 NEUROMUSCULAR REEDUCATION: CPT

## 2024-09-05 NOTE — PROGRESS NOTES
Diagnosis:   Vaginal prolapse (N81.10)          Referring Provider: Radha Siegel  Date of Evaluation:    8/13/2024    Precautions:  None Next MD visit:   none scheduled  Date of Surgery: n/a   Insurance Primary/Secondary: [employer] / N/A     # Auth Visits: 8 visits 6/17-10/30            Subjective: Feels like she is able to empty bladder better. Feeling better overall regarding pelvic pressure.Feeling like pelvic floor muscles are getting stronger.    Pain: 0/10      Objective: Tx flow sheet       Initial evaluation: w updates  URINARY HABITS  Types of symptoms: incomplete emptying, nocturia, and other: urgency, strains some times to \"fully\" empty bladder  Abdominal/Vaginal Pressure complaints: yes  Urinary Frequency: 5x/day but has urgency throughout the day  Empty bladder just in case: some times      BOWEL HABITS  Types of symptoms: Constipation   Frequency of bowel movements: 1x/day  Stool consistency: [place] Stool Scale: 1, 4  Do you strain with defecation: Yes     SEXUAL HEALTH STATUS  Sexual [place] Status: not active    Strength (MMT) 5/5 COOPER LE except hip abd and ext 4/5  Transverse Abdominis: 1/5    Flexibility Summary: WNL COOPER LE except hamstrings -10 B 90/90    Informed consent for internal pelvic evaluation given: Yes    External Observation:   Voluntary contraction: present   Voluntary relaxation: present  Involuntary contraction: absent  Involuntary relaxation: present    Mons pubis: WNL  Labia majora: WNL  Labia minora: WNL  Urethral meatus: WNL  Introitus: WNL  Perineal body: WNL    Sensory/Reflex:  Vestibule: normal bilaterally  Anal [place]: Not Tested    Internal Examination   Pelvic Floor Muscle strength: (PERF= Power/Endurance/Reps/Fast) MMT: 2/3/5/4  Accessory Muscle Use: abdominals    Tissue Laxity Test:  Anterior Wall: Min  Posterior Wall: Mod  Apical: WNL    Eccentric lengthening contraction: wnl  Bearing down Valsalva maneuver (2-3x): + prolapse    Internal Palpation: WNL        Assessment: Improved coordination of pelvic floor muscles via fascially connected mm in functional activities. Progressed pelvic brace with higher level activities of daily living . Pt had no difficulty or leakage throughout. Pt demonstrated correct body mechanics with hip hinging after instructions. Pt education: Pressure management strategies w activities of daily living .      Goals:   Goals: (to be met in 10 visits)-progressing  1. Independent in HEP and progression with improved understanding of bladder/bowel health, bladder retraining and long-term management.    2. Patient will demonstrate improved bladder /bowel voiding habits to voiding every 2 hours with less straining.  3. Patient will demonstrate improve PFM isolation, coordination and strength so she is able to reduce urinary urge and prevent leakage during ADL's.  4. Pt will have increased transverse abdominis muscle strength to 2/5 and hip strength to 5/5 to assist with supporting pelvic floor muscles.   5. PFM contraction before increase intra-abdominal pressure.    Plan: Continue plan of care as pt tolerates with focus on pelvic floor muscles . Next visit:pelvic brace w functional activities  Date: 8/20/2024  TX#: 2/10  8 approved Date:     9/5/2024             TX#: 3/ Date:                 TX#: 4/ Date:                 TX#: 5/ Date:   Tx#: 6/   NM/TE    Fanny/phys pelvic floor muscles /diaphragm /Transverse abdominis /fascially connected mm education     Pt education: incorporating kegel's w activities of daily living     Pressure management strategies with visual handout    Retraining-  diaphragmatic breathing   abdominal bracing   Kegel    Kegel +  iso hip add  10\" x10     Kegel + articulating bridge x10 w isometric adduction     Kegel +  Resisted   ER RTB  x10     Kegel + clamshell RTB x10 R/L      Kegel +  U/LE lift  x10     Kegel with plie/ER resisted RTB x10     Issued HEP with GTB for above exercises    Sit on ball-  Kegel-tonic,  phasic    Kegel +  U/LE lift  x10     Standing-  Kegel + iso hip add + squat w ball behind back x20    NM/TE    Fanny/phys pelvic floor muscles /diaphragm /Transverse abdominis /fascially connected mm education     Pt education: incorporating kegel's w activities of daily living     Pressure management strategies with visual handout    Retraining-  diaphragmatic breathing   abdominal bracing   Kegel    NuStep 5min L6    Shuttle- DLP  31# with pelvic brace + iso hip adduction x30 reps    Body mechanics with lifting    Deadlift 5#, 10# x10 ea w coordinated breathing     Precor-  SS w abdominal bracing and diaphragmatic breathing 10# x15 ea    TRX squat w pressure management breathing/kegel + pull up x10    Sitting on ball-  Kegel +  U/LE lift  x20   NM/TE NM/TE NM/TE   HEP: diaphragmatic breathing x10, abdominal bracing x10, Kegel 10 repetitions 3x/day, 10 sec on/10 sec off, 2 sec on 2-4 sec off , + additions above    Charges: NM 15  TE2 30       Total Timed Treatment: 45 min  Total Treatment Time: 45 min

## 2024-09-12 ENCOUNTER — OFFICE VISIT (OUTPATIENT)
Dept: PHYSICAL THERAPY | Facility: HOSPITAL | Age: 54
End: 2024-09-12
Attending: INTERNAL MEDICINE
Payer: COMMERCIAL

## 2024-09-12 PROCEDURE — 97110 THERAPEUTIC EXERCISES: CPT

## 2024-09-12 PROCEDURE — 97112 NEUROMUSCULAR REEDUCATION: CPT

## 2024-09-12 NOTE — PROGRESS NOTES
Diagnosis:   Vaginal prolapse (N81.10)          Referring Provider: Radha Siegel  Date of Evaluation:    8/13/2024    Precautions:  None Next MD visit:   none scheduled  Date of Surgery: n/a   Insurance Primary/Secondary: [employer] [place] HMO / N/A     # Auth Visits: 8 visits 6/17-10/30            Subjective: physical therapy is helping to reduce pelvic pressure.     Pain: 0/10      Objective: Tx flow sheet       Initial evaluation: w updates  URINARY HABITS  Types of symptoms: incomplete emptying, nocturia, and other: urgency, strains some times to \"fully\" empty bladder  Abdominal/Vaginal Pressure complaints: yes  Urinary Frequency: 5x/day but has urgency throughout the day  Empty bladder just in case: some times      BOWEL HABITS  Types of symptoms: Constipation   Frequency of bowel movements: 1x/day  Stool consistency: [place] Stool Scale: 1, 4  Do you strain with defecation: Yes     SEXUAL HEALTH STATUS  Sexual [place] Status: not active    Strength (MMT) 5/5 COOPER LE except hip abd and ext 4/5  Transverse Abdominis: 1/5    Flexibility Summary: WNL COOPER LE except hamstrings -10 B 90/90    Informed consent for internal pelvic evaluation given: Yes    External Observation:   Voluntary contraction: present   Voluntary relaxation: present  Involuntary contraction: absent  Involuntary relaxation: present    Mons pubis: WNL  Labia majora: WNL  Labia minora: WNL  Urethral meatus: WNL  Introitus: WNL  Perineal body: WNL    Sensory/Reflex:  Vestibule: normal bilaterally  Anal [place]: Not Tested    Internal Examination   Pelvic Floor Muscle strength: (PERF= Power/Endurance/Reps/Fast) MMT: 2/3/5/4  Accessory Muscle Use: abdominals    Tissue Laxity Test:  Anterior Wall: Min  Posterior Wall: Mod  Apical: WNL    Eccentric lengthening contraction: wnl  Bearing down Valsalva maneuver (2-3x): + prolapse    Internal Palpation: WNL       Assessment: Improved coordination of pelvic floor muscles via fascially connected mm in functional  activities. Progressed pelvic brace with higher level activities of daily living . Pt had no difficulty or leakage throughout session. Pt demonstrated correct body mechanics with hip hinging after instructions. Pt education: Pressure management strategies w activities of daily living, prolapse do's and dont's  .      Goals:   Goals: (to be met in 10 visits)-progressing  1. Independent in HEP and progression with improved understanding of bladder/bowel health, bladder retraining and long-term management.    2. Patient will demonstrate improved bladder /bowel voiding habits to voiding every 2 hours with less straining.  3. Patient will demonstrate improve PFM isolation, coordination and strength so she is able to reduce urinary urge and prevent leakage during ADL's.  4. Pt will have increased transverse abdominis muscle strength to 2/5 and hip strength to 5/5 to assist with supporting pelvic floor muscles.   5. PFM contraction before increase intra-abdominal pressure.    Plan: Continue plan of care as pt tolerates with focus on pelvic floor muscles . Next visit:pelvic brace w functional activities  Date: 8/20/2024  TX#: 2/10  8 approved Date:     9/5/2024             TX#: 3/ Date:     9/12/2024             TX#: 4/ Date:                 TX#: 5/ Date:   Tx#: 6/   NM/TE    Fanny/phys pelvic floor muscles /diaphragm /Transverse abdominis /fascially connected mm education     Pt education: incorporating kegel's w activities of daily living     Pressure management strategies with visual handout    Retraining-  diaphragmatic breathing   abdominal bracing   Kegel    Kegel +  iso hip add  10\" x10     Kegel + articulating bridge x10 w isometric adduction     Kegel +  Resisted   ER RTB  x10     Kegel + clamshell RTB x10 R/L      Kegel +  U/LE lift  x10     Kegel with plie/ER resisted RTB x10     Issued HEP with [facility] for above exercises    Sit on ball-  Kegel-tonic, phasic    Kegel +  U/LE lift  x10     Standing-  Kegel + iso hip  add + squat w ball behind back x20    NM/TE    Fanny/phys pelvic floor muscles /diaphragm /Transverse abdominis /fascially connected mm education     Pt education: incorporating kegel's w activities of daily living     Pressure management strategies     Retraining-  diaphragmatic breathing   abdominal bracing   Kegel    NuStep 5min L6    Shuttle- DLP  31# with pelvic brace + iso hip adduction x30 reps    Body mechanics with lifting    Deadlift 5#, 10# x10 ea w coordinated breathing     Precor-  SS w abdominal bracing and diaphragmatic breathing 10# x15 ea    TRX squat w pressure management breathing/kegel + pull up x10    Sitting on ball-  Kegel +  U/LE lift  x20   NM/TE    Fanny/phys pelvic floor muscles /diaphragm /Transverse abdominis /fascially connected mm education     Pt education: incorporating kegel's w activities of daily living     Pressure management strategies     prolapse do's and dont's     Retraining-  diaphragmatic breathing   abdominal bracing   Kegel    NuStep 5min L6    Shuttle- DLP  37# with pelvic brace + iso hip adduction x30 reps    Body mechanics with lifting    Deadlift 10# 2x10 ea w coordinated breathing     Precor-  SS w abdominal bracing and diaphragmatic breathing 10# x15 ea    TRX squat w pressure management breathing/kegel + pull up x10    Sitting on ball-  Tonic/phasic kegel's  Kegel +  U/LE lift  x20 NM/TE NM/TE   HEP: diaphragmatic breathing x10, abdominal bracing x10, Kegel 10 repetitions 3x/day, 10 sec on/10 sec off, 2 sec on 2-4 sec off , + additions above    Charges: NM 15  TE2 30       Total Timed Treatment: 45 min  Total Treatment Time: 45 min

## 2024-09-17 ENCOUNTER — OFFICE VISIT (OUTPATIENT)
Dept: PHYSICAL THERAPY | Facility: HOSPITAL | Age: 54
End: 2024-09-17
Attending: INTERNAL MEDICINE
Payer: COMMERCIAL

## 2024-09-17 PROCEDURE — 97112 NEUROMUSCULAR REEDUCATION: CPT

## 2024-09-17 PROCEDURE — 97110 THERAPEUTIC EXERCISES: CPT

## 2024-09-17 NOTE — PROGRESS NOTES
Diagnosis:   Vaginal prolapse (N81.10)          Referring Provider: Radha Siegel  Date of Evaluation:    8/13/2024    Precautions:  None Next MD visit:   none scheduled  Date of Surgery: n/a   Insurance Primary/Secondary: [employer] [place] HMO / N/A     # Auth Visits: 8 visits 6/17-10/30            Subjective: Has started to incorporate kegel and diaphragmatic breathing with activities of daily living .    Pain: 0/10      Objective: Tx flow sheet       Initial evaluation: w updates  URINARY HABITS  Types of symptoms: incomplete emptying, nocturia, and other: urgency, strains some times to \"fully\" empty bladder  Abdominal/Vaginal Pressure complaints: yes  Urinary Frequency: 5x/day but has urgency throughout the day  Empty bladder just in case: some times      BOWEL HABITS  Types of symptoms: Constipation   Frequency of bowel movements: 1x/day  Stool consistency: [place] Stool Scale: 1, 4  Do you strain with defecation: Yes     SEXUAL HEALTH STATUS  Sexual [place] Status: not active    Strength (MMT) 5/5 COOPER LE except hip abd and ext 4/5  Transverse Abdominis: 1/5    Flexibility Summary: WNL COOPER LE except hamstrings -10 B 90/90    Informed consent for internal pelvic evaluation given: Yes    External Observation:   Voluntary contraction: present   Voluntary relaxation: present  Involuntary contraction: absent  Involuntary relaxation: present    Mons pubis: WNL  Labia majora: WNL  Labia minora: WNL  Urethral meatus: WNL  Introitus: WNL  Perineal body: WNL    Sensory/Reflex:  Vestibule: normal bilaterally  Anal Rom[place]: Not Tested    Internal Examination   Pelvic Floor Muscle strength: (PERF= Power/Endurance/Reps/Fast) MMT: 2/3/5/4  Accessory Muscle Use: abdominals    Tissue Laxity Test:  Anterior Wall: Min  Posterior Wall: Mod  Apical: WNL    Eccentric lengthening contraction: wnl  Bearing down Valsalva maneuver (2-3x): + prolapse    Internal Palpation: WNL       Assessment: Improved coordination of pelvic floor muscles via  fascially connected mm in functional activities. Progressed pelvic brace with higher level activities of daily living . Pt had no difficulty or leakage throughout session. Pt demonstrated correct body mechanics with hip hinging after instructions. Pt education: Pressure management strategies w activities of daily living, prolapse do's and dont's. Close monitoring of physical therapist to ensure correct breathing sequences and to observe abdominal muscles to ensure correct contraction.      Goals:   Goals: (to be met in 10 visits)-progressing  1. Independent in HEP and progression with improved understanding of bladder/bowel health, bladder retraining and long-term management.    2. Patient will demonstrate improved bladder /bowel voiding habits to voiding every 2 hours with less straining.  3. Patient will demonstrate improve PFM isolation, coordination and strength so she is able to reduce urinary urge and prevent leakage during ADL's.  4. Pt will have increased transverse abdominis muscle strength to 2/5 and hip strength to 5/5 to assist with supporting pelvic floor muscles.   5. PFM contraction before increase intra-abdominal pressure.    Plan: Continue plan of care as pt tolerates with focus on pelvic floor muscles . Next visit:review bladder and bowel habits  Date: 8/20/2024  TX#: 2/10  8 approved Date:     9/5/2024             TX#: 3/ Date:     9/12/2024             TX#: 4/ Date:        9/17/2024          TX#: 5/ Date:   Tx#: 6/ Date:   Tx#: 7/ Date:   Tx#: 8/ Date:   Tx#: 9/ Date:   Tx#: 10/   NM/TE    Fanny/phys pelvic floor muscles /diaphragm /Transverse abdominis /fascially connected mm education     Pt education: incorporating kegel's w activities of daily living     Pressure management strategies with visual handout    Retraining-  diaphragmatic breathing   abdominal bracing   Kegel    Kegel +  iso hip add  10\" x10     Kegel + articulating bridge x10 w isometric adduction     Kegel +  Resisted   ER  RTB  x10     Kegel + clamshell RTB x10 R/L      Kegel +  U/LE lift  x10     Kegel with plie/ER resisted RTB x10     Issued HEP with [facility] for above exercises    Sit on ball-  Kegel-tonic, phasic    Kegel +  U/LE lift  x10     Standing-  Kegel + iso hip add + squat w ball behind back x20    NM/TE    Fanny/phys pelvic floor muscles /diaphragm /Transverse abdominis /fascially connected mm education     Pt education: incorporating kegel's w activities of daily living     Pressure management strategies     Retraining-  diaphragmatic breathing   abdominal bracing   Kegel    NuStep 5min L6    Shuttle- DLP  31# with pelvic brace + iso hip adduction x30 reps    Body mechanics with lifting    Deadlift 5#, 10# x10 ea w coordinated breathing     Precor-  SS w abdominal bracing and diaphragmatic breathing 10# x15 ea    TRX squat w pressure management breathing/kegel + pull up x10    Sitting on ball-  Kegel +  U/LE lift  x20   NM/TE    Fanny/phys pelvic floor muscles /diaphragm /Transverse abdominis /fascially connected mm education     Pt education: incorporating kegel's w activities of daily living     Pressure management strategies     prolapse do's and dont's     Retraining-  diaphragmatic breathing   abdominal bracing   Kegel    NuStep 5min L6    Shuttle- DLP  37# with pelvic brace + iso hip adduction x30 reps    Body mechanics with lifting    Deadlift 10# 2x10 ea w coordinated breathing     Precor-  SS w abdominal bracing and diaphragmatic breathing 10# x15 ea    TRX squat w pressure management breathing/kegel + pull up x10    Sitting on ball-  Tonic/phasic kegel's  Kegel +  U/LE lift  x20 NM/TE    Fanny/phys pelvic floor muscles /diaphragm /Transverse abdominis /fascially connected mm education     Pt education: incorporating kegel's w activities of daily living     Pressure management strategies     prolapse do's and dont's     Retraining-  diaphragmatic breathing   abdominal bracing   Kegel    NuStep 6min L6    Shuttle- DLP   37# with pelvic brace + iso hip adduction x30 reps    Sitting-flexion, R/L sidebending x10 ea    Body mechanics with lifting    Deadlift 10# 2x10 ea w coordinated breathing     Precor-  SS w abdominal bracing and diaphragmatic breathing 10# x15 ea    TRX squat w pressure management breathing/kegel + pull up x15    Sitting on ball-  Tonic/phasic kegel's  Kegel +  U/LE lift  x20 NM/TE    Bladder/bowel habits       HEP: diaphragmatic breathing x10, abdominal bracing x10, Kegel 10 repetitions 3x/day, 10 sec on/10 sec off, 2 sec on 2-4 sec off , + additions above    Charges: NM 15  TE2 30       Total Timed Treatment: 45 min  Total Treatment Time: 45 min

## 2024-09-24 ENCOUNTER — APPOINTMENT (OUTPATIENT)
Dept: PHYSICAL THERAPY | Facility: HOSPITAL | Age: 54
End: 2024-09-24
Attending: INTERNAL MEDICINE
Payer: COMMERCIAL

## 2024-10-01 ENCOUNTER — OFFICE VISIT (OUTPATIENT)
Dept: PHYSICAL THERAPY | Facility: HOSPITAL | Age: 54
End: 2024-10-01
Attending: INTERNAL MEDICINE
Payer: COMMERCIAL

## 2024-10-01 PROCEDURE — 97110 THERAPEUTIC EXERCISES: CPT

## 2024-10-01 PROCEDURE — 97112 NEUROMUSCULAR REEDUCATION: CPT

## 2024-10-01 NOTE — PROGRESS NOTES
Diagnosis:   Vaginal prolapse (N81.10)          Referring Provider: Radha Siegel  Date of Evaluation:    8/13/2024    Precautions:  None Next MD visit:   none scheduled  Date of Surgery: n/a   Insurance Primary/Secondary: [employer] [place] HMO / N/A     # Auth Visits: 8 visits 6/17-10/30            DISCHARGE SUMMARY  PT SEEN 6X'S IN PHYSICAL THERAPY    Subjective: Feels confident to continue HEP independently.  Gets increased urgency with stress and prior to leaving the house. Trying to incorporate calming bladder techniques in her ADL's that she has learned in physical therapy.  No leakage.     PFDI-20: 73.96/300 (was:71.88/300)- feels 20% better    Pain: 0/10      Objective: Tx flow sheet     Initial evaluation: With updates  URINARY HABITS  Types of symptoms:   incomplete emptying- improved  Nocturia- 0-1x  Urgency-no increased urgency throughout the day  strains some times to \"fully\" empty bladder-not anymore  Abdominal/Vaginal Pressure complaints: yes but less often  Urinary Frequency: 5x/day -having less urgency throughout the day  Empty bladder just in case: some times      BOWEL HABITS  Types of symptoms: Constipation-less often   Frequency of bowel movements: 1x/day  Stool consistency: [place] Stool Scale: 3, 4 (was:1, 4)  Do you strain with defecation: Yes-trying not to but when has pain with constipation     SEXUAL HEALTH STATUS  Sexual [place] Status: not active    Strength (MMT) 5/5 COOPER LE except hip abd and ext 5/5 (was:4/5)  Transverse Abdominis: 2/5 (was:1/5)    Flexibility Summary: WNL COOPER LE except hamstrings -10 B 90/90    Informed consent for internal pelvic evaluation given: Yes-PT DEFERRED INTERNAL RE-ASSESSMENT     External Observation:   Voluntary contraction: present   Voluntary relaxation: present  Involuntary contraction: absent  Involuntary relaxation: present    Mons pubis: WNL  Labia majora: WNL  Labia minora: WNL  Urethral meatus: WNL  Introitus: WNL  Perineal body:  WNL    Sensory/Reflex:  Vestibule: normal bilaterally    Internal Examination   Pelvic Floor Muscle strength: (PERF= Power/Endurance/Reps/Fast) MMT: 2/3/5/4-PT DEFERRED INTERNAL RE-ASSESSMENT TODAY  Accessory Muscle Use: abdominals    Tissue Laxity Test:  Anterior Wall: Min  Posterior Wall: Mod  Apical: WNL    Eccentric lengthening contraction: wnl  Bearing down Valsalva maneuver (2-3x): + prolapse    Internal Palpation: WNL       Assessment: Pt has made significant improvement in physical therapy with decrease leakage and urgency, increased hip and core strength, and function with correct transferring to not increase pelvic floor muscles pressure.  All goals met. Pt still has prolapse symptoms. Pt motivated to continue HEP.          Goals:   Goals: (to be met in 10 visits)-  1. Independent in HEP and progression with improved understanding of bladder/bowel health, bladder retraining and long-term management.  -MET  2. Patient will demonstrate improved bladder /bowel voiding habits to voiding every 2 hours with less straining.-MET  3. Patient will demonstrate improve PFM isolation, coordination and strength so she is able to reduce urinary urge and prevent leakage during ADL's.-MET  4. Pt will have increased transverse abdominis muscle strength to 2/5 and hip strength to 5/5 to assist with supporting pelvic floor muscles. -MET  5. PFM contraction before increase intra-abdominal pressure.-MET    Plan: Pt considered discharged from physical therapy.  Pt instructed to call clinic if he/she has any further questions and to continue home exercise program.   Date: 8/20/2024  TX#: 2/10  8 approved Date:     9/5/2024             TX#: 3/ Date:     9/12/2024             TX#: 4/ Date:        9/17/2024          TX#: 5/ Date: 10/1/2024   Tx#: 6/   NM/TE    Fanny/phys pelvic floor muscles /diaphragm /Transverse abdominis /fascially connected mm education     Pt education: incorporating kegel's w activities of daily living      Pressure management strategies with visual handout    Retraining-  diaphragmatic breathing   abdominal bracing   Kegel    Kegel +  iso hip add  10\" x10     Kegel + articulating bridge x10 w isometric adduction     Kegel +  Resisted   ER RTB  x10     Kegel + clamshell RTB x10 R/L      Kegel +  U/LE lift  x10     Kegel with plie/ER resisted RTB x10     Issued HEP with [facility] for above exercises    Sit on ball-  Kegel-tonic, phasic    Kegel +  U/LE lift  x10     Standing-  Kegel + iso hip add + squat w ball behind back x20    NM/TE    Fanny/phys pelvic floor muscles /diaphragm /Transverse abdominis /fascially connected mm education     Pt education: incorporating kegel's w activities of daily living     Pressure management strategies     Retraining-  diaphragmatic breathing   abdominal bracing   Kegel    NuStep 5min L6    Shuttle- DLP  31# with pelvic brace + iso hip adduction x30 reps    Body mechanics with lifting    Deadlift 5#, 10# x10 ea w coordinated breathing     Precor-  SS w abdominal bracing and diaphragmatic breathing 10# x15 ea    TRX squat w pressure management breathing/kegel + pull up x10    Sitting on ball-  Kegel +  U/LE lift  x20   NM/TE    Fanny/phys pelvic floor muscles /diaphragm /Transverse abdominis /fascially connected mm education     Pt education: incorporating kegel's w activities of daily living     Pressure management strategies     prolapse do's and dont's     Retraining-  diaphragmatic breathing   abdominal bracing   Kegel    NuStep 5min L6    Shuttle- DLP  37# with pelvic brace + iso hip adduction x30 reps    Body mechanics with lifting    Deadlift 10# 2x10 ea w coordinated breathing     Precor-  SS w abdominal bracing and diaphragmatic breathing 10# x15 ea    TRX squat w pressure management breathing/kegel + pull up x10    Sitting on ball-  Tonic/phasic kegel's  Kegel +  U/LE lift  x20 NM/TE    Fanny/phys pelvic floor muscles /diaphragm /Transverse abdominis /fascially connected mm  education     Pt education: incorporating kegel's w activities of daily living     Pressure management strategies     prolapse do's and dont's     Retraining-  diaphragmatic breathing   abdominal bracing   Kegel    NuStep 6min L6    Shuttle- DLP  37# with pelvic brace + iso hip adduction x30 reps    Sitting-flexion, R/L sidebending x10 ea    Body mechanics with lifting    Deadlift 10# 2x10 ea w coordinated breathing     Precor-  SS w abdominal bracing and diaphragmatic breathing 10# x15 ea    TRX squat w pressure management breathing/kegel + pull up x15    Sitting on ball-  Tonic/phasic kegel's  Kegel +  U/LE lift  x20 NM/TE  Progress note     Reviewed HEP    PFDI scoring and interpretion with patient strategies to reduce impairments.     Bladder/bowel habits strategies    Splinting for prolapse    prolapse do's and dont's    HEP: diaphragmatic breathing x10, abdominal bracing x10, Kegel 10 repetitions 3x/day, 10 sec on/10 sec off, 2 sec on 2-4 sec off , + additions above    Charges: NM2 30  TE 15       Total Timed Treatment: 45 min  Total Treatment Time: 45 min

## 2024-10-08 ENCOUNTER — APPOINTMENT (OUTPATIENT)
Dept: PHYSICAL THERAPY | Facility: HOSPITAL | Age: 54
End: 2024-10-08
Attending: INTERNAL MEDICINE
Payer: COMMERCIAL

## 2024-10-15 ENCOUNTER — APPOINTMENT (OUTPATIENT)
Dept: PHYSICAL THERAPY | Facility: HOSPITAL | Age: 54
End: 2024-10-15
Attending: INTERNAL MEDICINE
Payer: COMMERCIAL

## 2024-10-17 DIAGNOSIS — Z12.11 SCREENING FOR COLON CANCER: Primary | ICD-10-CM

## 2024-10-22 ENCOUNTER — APPOINTMENT (OUTPATIENT)
Dept: PHYSICAL THERAPY | Facility: HOSPITAL | Age: 54
End: 2024-10-22
Attending: INTERNAL MEDICINE
Payer: COMMERCIAL

## 2024-10-31 ENCOUNTER — LAB REQUISITION (OUTPATIENT)
Dept: LAB | Facility: HOSPITAL | Age: 54
End: 2024-10-31
Payer: COMMERCIAL

## 2024-10-31 DIAGNOSIS — D48.5 NEOPLASM OF UNCERTAIN BEHAVIOR OF SKIN: ICD-10-CM

## 2024-10-31 PROCEDURE — 88305 TISSUE EXAM BY PATHOLOGIST: CPT | Performed by: PHYSICIAN ASSISTANT

## 2025-01-30 ENCOUNTER — TELEPHONE (OUTPATIENT)
Dept: INTERNAL MEDICINE CLINIC | Facility: CLINIC | Age: 55
End: 2025-01-30

## 2025-01-30 ENCOUNTER — APPOINTMENT (OUTPATIENT)
Dept: LAB | Age: 55
End: 2025-01-30
Attending: INTERNAL MEDICINE
Payer: COMMERCIAL

## 2025-01-30 PROCEDURE — 82274 ASSAY TEST FOR BLOOD FECAL: CPT

## 2025-01-30 NOTE — TELEPHONE ENCOUNTER
A FIT test kit was recently mailed to patient. They are due to complete this.   Discussed in detail w/patient.  Patient verbalized understanding.

## 2025-02-04 ENCOUNTER — LAB ENCOUNTER (OUTPATIENT)
Dept: LAB | Facility: HOSPITAL | Age: 55
End: 2025-02-04
Attending: INTERNAL MEDICINE
Payer: COMMERCIAL

## 2025-02-04 DIAGNOSIS — Z13.228 SCREENING FOR ENDOCRINE, METABOLIC AND IMMUNITY DISORDER: ICD-10-CM

## 2025-02-04 DIAGNOSIS — Z13.29 SCREENING FOR ENDOCRINE, METABOLIC AND IMMUNITY DISORDER: ICD-10-CM

## 2025-02-04 DIAGNOSIS — R79.89 ABNORMAL TSH: Primary | ICD-10-CM

## 2025-02-04 DIAGNOSIS — Z00.00 PHYSICAL EXAM, ANNUAL: ICD-10-CM

## 2025-02-04 DIAGNOSIS — Z13.0 SCREENING FOR ENDOCRINE, METABOLIC AND IMMUNITY DISORDER: ICD-10-CM

## 2025-02-04 DIAGNOSIS — Z12.11 SCREENING FOR COLON CANCER: ICD-10-CM

## 2025-02-04 LAB
ALBUMIN SERPL-MCNC: 4.4 G/DL (ref 3.2–4.8)
ALBUMIN/GLOB SERPL: 1.8 {RATIO} (ref 1–2)
ALP LIVER SERPL-CCNC: 53 U/L
ALT SERPL-CCNC: 19 U/L
ANION GAP SERPL CALC-SCNC: 8 MMOL/L (ref 0–18)
AST SERPL-CCNC: 20 U/L (ref ?–34)
BASOPHILS # BLD AUTO: 0.02 X10(3) UL (ref 0–0.2)
BASOPHILS NFR BLD AUTO: 0.3 %
BILIRUB SERPL-MCNC: 0.6 MG/DL (ref 0.3–1.2)
BUN BLD-MCNC: 14 MG/DL (ref 9–23)
CALCIUM BLD-MCNC: 9.2 MG/DL (ref 8.7–10.6)
CHLORIDE SERPL-SCNC: 103 MMOL/L (ref 98–112)
CHOLEST SERPL-MCNC: 165 MG/DL (ref ?–200)
CO2 SERPL-SCNC: 30 MMOL/L (ref 21–32)
CREAT BLD-MCNC: 0.99 MG/DL
EGFRCR SERPLBLD CKD-EPI 2021: 69 ML/MIN/1.73M2 (ref 60–?)
EOSINOPHIL # BLD AUTO: 0.3 X10(3) UL (ref 0–0.7)
EOSINOPHIL NFR BLD AUTO: 4.7 %
ERYTHROCYTE [DISTWIDTH] IN BLOOD BY AUTOMATED COUNT: 13 %
EST. AVERAGE GLUCOSE BLD GHB EST-MCNC: 128 MG/DL (ref 68–126)
FASTING PATIENT LIPID ANSWER: YES
FASTING STATUS PATIENT QL REPORTED: YES
GLOBULIN PLAS-MCNC: 2.5 G/DL (ref 2–3.5)
GLUCOSE BLD-MCNC: 115 MG/DL (ref 70–99)
HBA1C MFR BLD: 6.1 % (ref ?–5.7)
HCT VFR BLD AUTO: 43.7 %
HDLC SERPL-MCNC: 36 MG/DL (ref 40–59)
HGB BLD-MCNC: 14.4 G/DL
IMM GRANULOCYTES # BLD AUTO: 0.02 X10(3) UL (ref 0–1)
IMM GRANULOCYTES NFR BLD: 0.3 %
LDLC SERPL CALC-MCNC: 87 MG/DL (ref ?–100)
LYMPHOCYTES # BLD AUTO: 1.82 X10(3) UL (ref 1–4)
LYMPHOCYTES NFR BLD AUTO: 28.5 %
MCH RBC QN AUTO: 29.4 PG (ref 26–34)
MCHC RBC AUTO-ENTMCNC: 33 G/DL (ref 31–37)
MCV RBC AUTO: 89.2 FL
MONOCYTES # BLD AUTO: 0.46 X10(3) UL (ref 0.1–1)
MONOCYTES NFR BLD AUTO: 7.2 %
NEUTROPHILS # BLD AUTO: 3.77 X10 (3) UL (ref 1.5–7.7)
NEUTROPHILS # BLD AUTO: 3.77 X10(3) UL (ref 1.5–7.7)
NEUTROPHILS NFR BLD AUTO: 59 %
NONHDLC SERPL-MCNC: 129 MG/DL (ref ?–130)
OSMOLALITY SERPL CALC.SUM OF ELEC: 293 MOSM/KG (ref 275–295)
PLATELET # BLD AUTO: 206 10(3)UL (ref 150–450)
POTASSIUM SERPL-SCNC: 4.2 MMOL/L (ref 3.5–5.1)
PROT SERPL-MCNC: 6.9 G/DL (ref 5.7–8.2)
RBC # BLD AUTO: 4.9 X10(6)UL
SODIUM SERPL-SCNC: 141 MMOL/L (ref 136–145)
T4 FREE SERPL-MCNC: 1 NG/DL (ref 0.8–1.7)
TRIGL SERPL-MCNC: 250 MG/DL (ref 30–149)
TSI SER-ACNC: 5.04 UIU/ML (ref 0.55–4.78)
VLDLC SERPL CALC-MCNC: 40 MG/DL (ref 0–30)
WBC # BLD AUTO: 6.4 X10(3) UL (ref 4–11)

## 2025-02-04 PROCEDURE — 84443 ASSAY THYROID STIM HORMONE: CPT

## 2025-02-04 PROCEDURE — 80053 COMPREHEN METABOLIC PANEL: CPT

## 2025-02-04 PROCEDURE — 84439 ASSAY OF FREE THYROXINE: CPT

## 2025-02-04 PROCEDURE — 83036 HEMOGLOBIN GLYCOSYLATED A1C: CPT

## 2025-02-04 PROCEDURE — 85025 COMPLETE CBC W/AUTO DIFF WBC: CPT

## 2025-02-04 PROCEDURE — 36415 COLL VENOUS BLD VENIPUNCTURE: CPT

## 2025-02-04 PROCEDURE — 80061 LIPID PANEL: CPT

## 2025-02-06 LAB — HEMOCCULT STL QL: NEGATIVE

## 2025-04-03 ENCOUNTER — OFFICE VISIT (OUTPATIENT)
Dept: INTERNAL MEDICINE CLINIC | Facility: CLINIC | Age: 55
End: 2025-04-03
Payer: COMMERCIAL

## 2025-04-03 VITALS
DIASTOLIC BLOOD PRESSURE: 60 MMHG | HEIGHT: 66.5 IN | SYSTOLIC BLOOD PRESSURE: 108 MMHG | HEART RATE: 60 BPM | TEMPERATURE: 97 F | OXYGEN SATURATION: 98 % | RESPIRATION RATE: 16 BRPM | WEIGHT: 192.63 LBS | BODY MASS INDEX: 30.59 KG/M2

## 2025-04-03 DIAGNOSIS — N88.9 LESION OF CERVIX: ICD-10-CM

## 2025-04-03 DIAGNOSIS — N95.0 POSTMENOPAUSAL BLEEDING: Primary | ICD-10-CM

## 2025-04-03 PROCEDURE — G2211 COMPLEX E/M VISIT ADD ON: HCPCS | Performed by: NURSE PRACTITIONER

## 2025-04-03 PROCEDURE — 3078F DIAST BP <80 MM HG: CPT | Performed by: NURSE PRACTITIONER

## 2025-04-03 PROCEDURE — 3008F BODY MASS INDEX DOCD: CPT | Performed by: NURSE PRACTITIONER

## 2025-04-03 PROCEDURE — 3074F SYST BP LT 130 MM HG: CPT | Performed by: NURSE PRACTITIONER

## 2025-04-03 PROCEDURE — 99214 OFFICE O/P EST MOD 30 MIN: CPT | Performed by: NURSE PRACTITIONER

## 2025-04-03 NOTE — PATIENT INSTRUCTIONS
Make an appointment to see gynecology.  If Dr. Ferro is not an option Dr. Francis is another option here at [place].  Dr. Echeverria is also an option in [place].    Follow-up as needed or when routine care is due

## 2025-04-03 NOTE — PROGRESS NOTES
Dee Villa is a 54 year old female.  CHIEF COMPLAINT   Vaginal bleeding    HPI:   Has had vaginal spotting for 3 weeks on and off about 2 times a week- very small amount. On Sunday was a little more and was bright red. Is not bleeding now. No pelvic pain, itching or discharge. No urine symptoms. Hx of fibroid and ovarian cyst. Also with prolapse and doing pelvic floor therapy.      Current Outpatient Medications   Medication Sig Dispense Refill    NADOLOL 40 MG Oral Tab TAKE 1 TABLET(40 MG) BY MOUTH DAILY 90 tablet 3    Multiple Vitamins-Minerals (MULTIVITAMIN OR) Take by mouth daily.          Past Medical History:    Contusion of knee    Diverticulitis    Dysmenorrhea    Left ovarian cyst    Lipid screening    Lumbar strain    she injured her back about a week ago while lifting a child for about an hour and half    Lump in chest    mass near the sternal notch likely a lipoma    Right ovarian cyst    Uterine fibroid    two anterior-inferior uterine body fibroids    Visual impairment    contacts and glasses      Social History:  Social History     Socioeconomic History    Marital status:    Occupational History    Occupation: XRAY TECH     Employer: [employer]   Tobacco Use    Smoking status: Former     Current packs/day: 0.00     Types: Cigarettes     Quit date: 2/1/2015     Years since quitting: 10.1    Smokeless tobacco: Never    Tobacco comments:     QUIT IN 1/2015, 2 CIGS A DAY FOR 20 YEARS   Vaping Use    Vaping status: Never Used   Substance and Sexual Activity    Alcohol use: Yes     Alcohol/week: 3.0 standard drinks of alcohol     Types: 3 Glasses of wine per week     Comment: approx 1 drink per week    Drug use: No   Other Topics Concern    Caffeine Concern Yes    Stress Concern Yes    Weight Concern Yes     Comment: Would like to loose 20 lbs.    Special Diet No    Exercise No    Seat Belt Yes        REVIEW OF SYSTEMS:   See HPI     EXAM:     /60 (BP Location: Left arm,  Patient Position: Sitting, Cuff Size: adult)   Pulse 60   Temp 97.1 °F (36.2 °C) (Temporal)   Resp 16   Ht 5' 6.5\" (1.689 m)   Wt 192 lb 9.6 oz (87.4 kg)   LMP 03/15/2022   SpO2 98%   BMI 30.62 kg/m²   Body mass index is 30.62 kg/m².   GENERAL: well developed, well nourished,in no apparent distress  HEENT: atraumatic, normocephalic  LUNGS: clear to auscultation; no rhonchi, rales, or wheezing  CARDIO: RRR without murmur  GI: no masses, organomegaly or tenderness  : introitus is normal. Prolapse present. Cervix is pink- there is a lesion present that is about 1.5-2cm in length and bright red and flat and about about 1 cm wide. It is not painful or bleeding. No adnexal tenderness or masses.   MUSCULOSKELETAL: No obvious joint deformity or swelling.  Normal gait.  EXTREMITIES: no edema or masses   NEURO: Oriented times three    LABS:      Lab Results   Component Value Date    WBC 6.4 02/04/2025    RBC 4.90 02/04/2025    HGB 14.4 02/04/2025    HCT 43.7 02/04/2025    MCV 89.2 02/04/2025    MCH 29.4 02/04/2025    MCHC 33.0 02/04/2025    RDW 13.0 02/04/2025    .0 02/04/2025      Lab Results   Component Value Date     (H) 02/04/2025    BUN 14 02/04/2025    BUNCREA 15.2 11/21/2019    CREATSERUM 0.99 02/04/2025    ANIONGAP 8 02/04/2025    GFR 91 02/13/2017    GFRNAA 64 05/24/2022    GFRAA 74 05/24/2022    CA 9.2 02/04/2025    OSMOCALC 293 02/04/2025    ALKPHO 53 02/04/2025    AST 20 02/04/2025    ALT 19 02/04/2025    BILT 0.6 02/04/2025    TP 6.9 02/04/2025    ALB 4.4 02/04/2025    GLOBULIN 2.5 02/04/2025    AGRATIO 2.0 06/27/2011     02/04/2025    K 4.2 02/04/2025     02/04/2025    CO2 30.0 02/04/2025      Lab Results   Component Value Date    CHOLEST 165 02/04/2025    TRIG 250 (H) 02/04/2025    HDL 36 (L) 02/04/2025    LDL 87 02/04/2025    VLDL 40 (H) 02/04/2025    TCHDLRATIO 2.39 02/13/2017    NONHDLC 129 02/04/2025      Lab Results   Component Value Date    T4F 1.0 02/04/2025    TSH  5.041 (H) 02/04/2025      Lab Results   Component Value Date     (H) 02/04/2025    A1C 6.1 (H) 02/04/2025        IMAGING:     No results found.     ASSESSMENT AND PLAN:   1. Postmenopausal bleeding  2. Lesion of cervix  -There is a lesion extending from the cervix present on exam that is not currently bleeding.  This may be where the blood has come from in the last few weeks.  -Patient advised to see GYN for further evaluation and was provided referrals  - OBG Referral - In Network    The patient indicates understanding of these issues and agrees to the plan.  The patient is asked to return as needed or when routine care is due.    Total face to face time was 30 mins, more than 50% of the time was spent in counseling and/or coordination of care related to vaginal bleeding.

## 2025-04-10 ENCOUNTER — OFFICE VISIT (OUTPATIENT)
Dept: OBGYN CLINIC | Facility: CLINIC | Age: 55
End: 2025-04-10
Payer: COMMERCIAL

## 2025-04-10 VITALS
SYSTOLIC BLOOD PRESSURE: 110 MMHG | BODY MASS INDEX: 30.34 KG/M2 | DIASTOLIC BLOOD PRESSURE: 74 MMHG | HEIGHT: 66.5 IN | HEART RATE: 71 BPM | WEIGHT: 191 LBS

## 2025-04-10 DIAGNOSIS — N84.1 CERVICAL POLYP: Primary | ICD-10-CM

## 2025-04-10 DIAGNOSIS — N95.0 POSTMENOPAUSAL BLEEDING: ICD-10-CM

## 2025-04-10 PROCEDURE — 3078F DIAST BP <80 MM HG: CPT | Performed by: STUDENT IN AN ORGANIZED HEALTH CARE EDUCATION/TRAINING PROGRAM

## 2025-04-10 PROCEDURE — 3008F BODY MASS INDEX DOCD: CPT | Performed by: STUDENT IN AN ORGANIZED HEALTH CARE EDUCATION/TRAINING PROGRAM

## 2025-04-10 PROCEDURE — 99204 OFFICE O/P NEW MOD 45 MIN: CPT | Performed by: STUDENT IN AN ORGANIZED HEALTH CARE EDUCATION/TRAINING PROGRAM

## 2025-04-10 PROCEDURE — 88305 TISSUE EXAM BY PATHOLOGIST: CPT | Performed by: STUDENT IN AN ORGANIZED HEALTH CARE EDUCATION/TRAINING PROGRAM

## 2025-04-10 PROCEDURE — 3074F SYST BP LT 130 MM HG: CPT | Performed by: STUDENT IN AN ORGANIZED HEALTH CARE EDUCATION/TRAINING PROGRAM

## 2025-04-10 PROCEDURE — 57500 BIOPSY OF CERVIX: CPT | Performed by: STUDENT IN AN ORGANIZED HEALTH CARE EDUCATION/TRAINING PROGRAM

## 2025-04-10 NOTE — PROGRESS NOTES
[facility]  Obstetrics and Gynecology Referral  History & Physical    CC: Patient is a new patient and referred for PMB    Subjective:     HPI: Dee Villa is a 54 year old  female referred for PMB. Patient reports 1 month h/o PMB intermittently. Notes light pink spotting with wiping, never on underwear. Saw PCP 1 week ago for exam who noted \"something resembling lizard tongue\" on cervix. Was advised to see gyn for further evaluation. Pt denies any pelvic pain, abnormal discharge, or signs of infection from vagina.    OB History:  OB History    Para Term  AB Living   2 2       SAB IAB Ectopic Multiple Live Births             # Outcome Date GA Lbr Mickey/2nd Weight Sex Type Anes PTL Lv   2 Para            1 Para                Gyne History:  Menarche: 16  Period Cycle (Days): postmenopausal  Use of Birth Control (if yes, specify type): None  Hx Prior Abnormal Pap: No  Pap Date: 22  Patient's last menstrual period was 03/15/2022.      denies history of STDs (non-HPV).   Sexual history: Active? no  Birth control? postmenopause    Meds:  Medications Ordered Prior to Encounter[1]    All:  Allergies[2]    PMH:  Past Medical History[3]    PSH:  Past Surgical History[4]    Social History:  Social History     Socioeconomic History    Marital status:      Spouse name: Not on file    Number of children: Not on file    Years of education: Not on file    Highest education level: Not on file   Occupational History    Occupation: XRAY TECH     Employer: [employer]   Tobacco Use    Smoking status: Former     Current packs/day: 0.00     Types: Cigarettes     Quit date: 2015     Years since quitting: 10.1    Smokeless tobacco: Never    Tobacco comments:     QUIT IN 2015, 2 CIGS A DAY FOR 20 YEARS   Vaping Use    Vaping status: Never Used   Substance and Sexual Activity    Alcohol use: Yes     Alcohol/week: 3.0 standard drinks of alcohol     Types: 3 Glasses of wine per week      Comment: approx 1 drink per week    Drug use: No    Sexual activity: Not on file   Other Topics Concern     Service Not Asked    Blood Transfusions Not Asked    Caffeine Concern Yes    Occupational Exposure Not Asked    Hobby Hazards Not Asked    Sleep Concern Not Asked    Stress Concern Yes    Weight Concern Yes     Comment: Would like to loose 20 lbs.    Special Diet No    Back Care Not Asked    Exercise No    Bike Helmet Not Asked    Seat Belt Yes    Self-Exams Not Asked   Social History Narrative    Not on file     Social Drivers of Health     Food Insecurity: Not on file   Transportation Needs: Not on file   Stress: Not on file   Housing Stability: Not on file         Family History:  Family History[5]    Review of Systems:  General: no complaints per category. See HPI for additional information.   Breast: no complaints per category. See HPI for additional information.   Respiratory: no complaints per category. See HPI for additional information.   Cardiovascular: no complaints per category. See HPI for additional information.   GI: no complaints per category. See HPI for additional information.   : no complaints per category. See HPI for additional information.   Heme: no complaints per category. See HPI for additional information.       Objective:     Vitals:    04/10/25 1655   BP: 110/74   Pulse: 71   Weight: 191 lb (86.6 kg)   Height: 66.5\"         Body mass index is 30.37 kg/m².    General: AAO.NAD.   CVS exam: normal peripheral perfusion  Chest: non-labored breathing, no tachypnea   Breast: deferred  Abdominal exam: soft, nontender, nondistended  Pelvic exam:   VULVA: normal appearing vulva with no masses, tenderness or lesions  PERINEUM:  normal appearing, no lesions   URETHRAL MEATUS:  normal appearing, no lesions   VAGINA: normal appearing vagina with normal color and discharge, no lesions  CERVIX: normal appearing cervix without discharge or lesions, endocervical polyp size 5 mm in width  and 1 cm in length with base in endocervix ~2mm  UTERUS: uterus is normal size, shape, consistency and nontender  ADNEXA: normal adnexa in size, nontender and no masses  PERIRECTAL:  normal appearing, no lesions   Ext: non-tender, no edema    Assessment:     Dee Villa is a 54 year old  female referred for PMB.         Plan:     Problem List Items Addressed This Visit    None  Visit Diagnoses         Cervical polyp    -  Primary    Relevant Orders    Specimen to pathology , tissue      Postmenopausal bleeding                  PMB  - Pt with 1 month h/o PMB intermittently, no other concerning symptoms  - Physical exam noted for small endocervical polyp, removed in office today, see separate procedure note  - Pt tolerated procedure well; bleeding and infection precautions given      All of the findings and plan were discussed with the patient.  She notes understanding and agrees with the plan of care.  All questions were answered to the best of my ability at this time.      Total patient time was 30 minutes in evaluation, consultation, and coordination of care.  This included face to face and non-face to face actions. The patient's questions and concerns were addressed.     RTC if new or worsening symptoms    Tere Hernandez DO  [facility]        Discussed with patient that there will not be further notification of normal or benign results other than receiving results on mychart. A [employer] message or telephone call will be placed by the physician and/or office staff if results are abnormal.     Note to patient and family   The 21st Century Cures Act makes medical notes available to patients in the interest of transparency.  However, please be advised that this is a medical document.  It is intended as gjni-yj-wrca communication.  It is written and medical language may contain abbreviations or verbiage that are technical and unfamiliar.  It may appear blunt or direct.  Medical documents are intended to  carry relevant information, facts as evident, and the clinical opinion of the practitioner.        This note could include assistance by Dragon voice recognition. Errors in content may be related to improper recognition by the system; efforts to review and correct have been done but errors may still exist.        [1]   Current Outpatient Medications on File Prior to Visit   Medication Sig Dispense Refill    NADOLOL 40 MG Oral Tab TAKE 1 TABLET(40 MG) BY MOUTH DAILY 90 tablet 3    Multiple Vitamins-Minerals (MULTIVITAMIN OR) Take by mouth daily.         No current facility-administered medications on file prior to visit.   [2]   Allergies  Allergen Reactions    Novocain [Procaine Hcl] DIZZINESS   [3]   Past Medical History:   Contusion of knee    Diverticulitis    Dysmenorrhea    Left ovarian cyst    Lipid screening    Lumbar strain    she injured her back about a week ago while lifting a child for about an hour and half    Lump in chest    mass near the sternal notch likely a lipoma    Right ovarian cyst    Uterine fibroid    two anterior-inferior uterine body fibroids    Visual impairment    contacts and glasses   [4]   Past Surgical History:  Procedure Laterality Date    Romina localization wire 1 site left (cpt=19281) Left     mastitis    Romina localization wire 1 site right (cpt=19281) Right     mastitis      10/27/1994; 2007    Other surgical history      Left elbow osteomyelitis    Upper arm/elbow surgery unlisted      left elbow for osteomyelitis   [5]   Family History  Problem Relation Age of Onset    Hypertension Mother     Cancer Paternal Grandmother         liver cancer?    Hypertension Father     Other (MI) Father 74

## 2025-04-10 NOTE — PROCEDURES
Procedure: Cervical polypectomy     Date of Procedure: 04/10/25     Pre-procedure diagnosis:  Cervical polyp      Post-procedure diagnosis:   Cervical polyp      Indications:   54 year old year old female  who presents for cervical polypectomy 2/2 cervical polyp.      Procedure details:  The procedure, risks, benefits and alternatives were discussed with the patient. The patient was informed of risks including but not limited to the risk of bleeding, infection, injury and insufficient tissue collection. All questions and concerns were addressed. The patient provided verbal and written consent.      The patient was placed in a supine position with feet positioned into stirrups. A sterile speculum was placed into the vagina and the cervix was visualized with findings noted below. The cervix was cleaned and prepped with betadine. A 1 cm cervical polyp was noted at the cervical os. Ring forceps were used to grasp the polyp and rotational tension was used to remove the polyp. The polyp was collected to be sent to pathology.  Silver nitrate was applied along the removal site. Good hemostasis noted. The patient tolerated the procedure well. The patient was advised to return to clinic as needed.       Findings:   Normal cervix, 1 cm cervical polyp    S/p cervical polypectomy   Good hemostasis      Disposition: Stable     Complications: None     Follow up:  as needed        DO JOANIE Sanchez - OBGYN    Discussed with patient that there will not be further notification of normal or benign results other than receiving results on [employer]. A [employer] message or telephone call will be placed by the physician and/or office staff if results are abnormal.     Note to patient and family   The 21st Century Cures Act makes medical notes available to patients in the interest of transparency.  However, please be advised that this is a medical document.  It is intended as rujr-pv-rxjj communication.  It is written and medical  language may contain abbreviations or verbiage that are technical and unfamiliar.  It may appear blunt or direct.  Medical documents are intended to carry relevant information, facts as evident, and the clinical opinion of the practitioner.

## 2025-06-11 ENCOUNTER — TELEPHONE (OUTPATIENT)
Dept: INTERNAL MEDICINE CLINIC | Facility: CLINIC | Age: 55
End: 2025-06-11

## 2025-06-11 DIAGNOSIS — H33.329 RETINAL HOLE, UNSPECIFIED LATERALITY: Primary | ICD-10-CM

## 2025-06-11 NOTE — TELEPHONE ENCOUNTER
Insurance: [facility] H[employer]  Provider's Name?: Nam Campos MD  Provider's Specialty?: Opthamalogy    Reason for Visit?: retinal hole  Diagnosis?:  Number of Visits Requested?: 2    Last Visit with Specialist?: 0   Is Appt. Already Scheduled?: yes        If so, Date?:  6/12/25  Once referral is approved pt can see referral via [employer]      Appointment is 6/12/25

## 2025-06-12 ENCOUNTER — TELEPHONE (OUTPATIENT)
Dept: INTERNAL MEDICINE CLINIC | Facility: CLINIC | Age: 55
End: 2025-06-12

## 2025-06-19 ENCOUNTER — MED REC SCAN ONLY (OUTPATIENT)
Dept: INTERNAL MEDICINE CLINIC | Facility: CLINIC | Age: 55
End: 2025-06-19
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
## 2025-06-24 ENCOUNTER — TELEPHONE (OUTPATIENT)
Dept: INTERNAL MEDICINE CLINIC | Facility: CLINIC | Age: 55
End: 2025-06-24

## 2025-06-24 ENCOUNTER — OFFICE VISIT (OUTPATIENT)
Dept: INTERNAL MEDICINE CLINIC | Facility: CLINIC | Age: 55
End: 2025-06-24
Payer: COMMERCIAL

## 2025-06-24 VITALS
HEIGHT: 66.5 IN | TEMPERATURE: 98 F | RESPIRATION RATE: 12 BRPM | WEIGHT: 195.81 LBS | SYSTOLIC BLOOD PRESSURE: 90 MMHG | DIASTOLIC BLOOD PRESSURE: 60 MMHG | BODY MASS INDEX: 31.1 KG/M2 | HEART RATE: 64 BPM

## 2025-06-24 DIAGNOSIS — E04.1 THYROID NODULE: ICD-10-CM

## 2025-06-24 DIAGNOSIS — Z12.11 SCREENING FOR COLON CANCER: ICD-10-CM

## 2025-06-24 DIAGNOSIS — N64.89 COMPLEX SCLEROSING LESION OF LEFT BREAST: ICD-10-CM

## 2025-06-24 DIAGNOSIS — I45.81 LONG QT SYNDROME TYPE 1: ICD-10-CM

## 2025-06-24 DIAGNOSIS — H33.329 RETINAL HOLE, UNSPECIFIED LATERALITY: ICD-10-CM

## 2025-06-24 DIAGNOSIS — Z63.0 PROBLEMS IN RELATIONSHIP WITH SPOUSE OR PARTNER: ICD-10-CM

## 2025-06-24 DIAGNOSIS — E78.2 MIXED HYPERLIPIDEMIA: ICD-10-CM

## 2025-06-24 DIAGNOSIS — Z00.00 PHYSICAL EXAM, ANNUAL: Primary | ICD-10-CM

## 2025-06-24 DIAGNOSIS — R73.03 PREDIABETES: ICD-10-CM

## 2025-06-24 DIAGNOSIS — Z12.31 ENCOUNTER FOR SCREENING MAMMOGRAM FOR MALIGNANT NEOPLASM OF BREAST: ICD-10-CM

## 2025-06-24 PROCEDURE — 3078F DIAST BP <80 MM HG: CPT | Performed by: INTERNAL MEDICINE

## 2025-06-24 PROCEDURE — 99396 PREV VISIT EST AGE 40-64: CPT | Performed by: INTERNAL MEDICINE

## 2025-06-24 PROCEDURE — 3074F SYST BP LT 130 MM HG: CPT | Performed by: INTERNAL MEDICINE

## 2025-06-24 PROCEDURE — 99213 OFFICE O/P EST LOW 20 MIN: CPT | Performed by: INTERNAL MEDICINE

## 2025-06-24 PROCEDURE — 3008F BODY MASS INDEX DOCD: CPT | Performed by: INTERNAL MEDICINE

## 2025-06-24 SDOH — SOCIAL STABILITY - SOCIAL INSECURITY: PROBLEMS IN RELATIONSHIP WITH SPOUSE OR PARTNER: Z63.0

## 2025-06-24 NOTE — TELEPHONE ENCOUNTER
Faxed auth to Retinal specialist, Dr. Campos, requesting copy of consult note and surg report.   [phone]. Confirmation received.

## 2025-06-30 ENCOUNTER — PATIENT MESSAGE (OUTPATIENT)
Dept: INTERNAL MEDICINE CLINIC | Facility: CLINIC | Age: 55
End: 2025-06-30

## 2025-06-30 DIAGNOSIS — I45.81 LONG QT SYNDROME TYPE 1: Primary | ICD-10-CM

## 2025-07-08 ENCOUNTER — LAB ENCOUNTER (OUTPATIENT)
Dept: LAB | Facility: HOSPITAL | Age: 55
End: 2025-07-08
Attending: INTERNAL MEDICINE
Payer: COMMERCIAL

## 2025-07-08 DIAGNOSIS — R73.03 PREDIABETES: ICD-10-CM

## 2025-07-08 DIAGNOSIS — E78.2 MIXED HYPERLIPIDEMIA: ICD-10-CM

## 2025-07-08 DIAGNOSIS — R79.89 ABNORMAL TSH: ICD-10-CM

## 2025-07-08 LAB
CHOLEST SERPL-MCNC: 168 MG/DL (ref ?–200)
EST. AVERAGE GLUCOSE BLD GHB EST-MCNC: 128 MG/DL (ref 68–126)
FASTING PATIENT LIPID ANSWER: YES
HBA1C MFR BLD: 6.1 % (ref ?–5.7)
HDLC SERPL-MCNC: 38 MG/DL (ref 40–59)
LDLC SERPL CALC-MCNC: 86 MG/DL (ref ?–100)
NONHDLC SERPL-MCNC: 130 MG/DL (ref ?–130)
TRIGL SERPL-MCNC: 267 MG/DL (ref 30–149)
TSI SER-ACNC: 4.52 UIU/ML (ref 0.55–4.78)
VLDLC SERPL CALC-MCNC: 43 MG/DL (ref 0–30)

## 2025-07-08 PROCEDURE — 80061 LIPID PANEL: CPT

## 2025-07-08 PROCEDURE — 84443 ASSAY THYROID STIM HORMONE: CPT

## 2025-07-08 PROCEDURE — 83036 HEMOGLOBIN GLYCOSYLATED A1C: CPT

## 2025-07-08 PROCEDURE — 36415 COLL VENOUS BLD VENIPUNCTURE: CPT

## 2025-07-09 ENCOUNTER — OFFICE VISIT (OUTPATIENT)
Facility: LOCATION | Age: 55
End: 2025-07-09
Payer: COMMERCIAL

## 2025-07-09 DIAGNOSIS — E04.2 MULTIPLE THYROID NODULES: Primary | ICD-10-CM

## 2025-07-09 PROCEDURE — 99214 OFFICE O/P EST MOD 30 MIN: CPT | Performed by: OTOLARYNGOLOGY

## 2025-07-09 NOTE — PROGRESS NOTES
Otolaryngology Consultation Note     HPI: 55 y/o F with h/o thyroid nodules. Here for follow up. Has undergone FNA in the past which was benign. Most recent US was a few years ago. No unintentional weight loss or loss of appetite. No dysphagia, odynophagia or difficulty breathing. No changes in voice/hoarseness. No signs/symptoms of hyper/hypothyroidism. No other new complaints.      Past Medical History: Past Medical History[1]     Past Surgical History: Past Surgical History[2]     Medication: Scheduled Meds:Scheduled Medications[3]  Continuous Infusions:Medication Infusions[4]  PRN Meds:.PRN Medications[5]     Allergies:  Allergies[6]  Pertinent Family History: Family History[7]     Pertinent Social History:   Social History     Socioeconomic History    Marital status:      Spouse name: Not on file    Number of children: Not on file    Years of education: Not on file    Highest education level: Not on file   Occupational History    Occupation: XRAY TECH     Employer: [employer]   Tobacco Use    Smoking status: Former     Current packs/day: 0.00     Types: Cigarettes     Quit date: 2/1/2015     Years since quitting: 10.4    Smokeless tobacco: Never    Tobacco comments:     QUIT IN 1/2015, 2 CIGS A DAY FOR 20 YEARS   Vaping Use    Vaping status: Never Used   Substance and Sexual Activity    Alcohol use: Not Currently     Comment: approx 1-2  drink per week    Drug use: No    Sexual activity: Not on file   Other Topics Concern     Service Not Asked    Blood Transfusions Not Asked    Caffeine Concern Yes    Occupational Exposure Not Asked    Hobby Hazards Not Asked    Sleep Concern Not Asked    Stress Concern Yes    Weight Concern Yes     Comment: Would like to loose 20 lbs.    Special Diet No    Back Care Not Asked    Exercise No    Bike Helmet Not Asked    Seat Belt Yes    Self-Exams Not Asked   Social History Narrative    Not on file     Social Drivers of Health     Food Insecurity: No  Food Insecurity (6/24/2025)    NCSS - Food Insecurity     Worried About Running Out of Food in the Last Year: No     Ran Out of Food in the Last Year: No   Transportation Needs: No Transportation Needs (6/24/2025)    NCSS - Transportation     Lack of Transportation: No   Stress: Not on file   Housing Stability: Not At Risk (6/24/2025)    NCSS - Housing/Utilities     Has Housing: Yes     Worried About Losing Housing: No     Unable to Get Utilities: No        Review of Systems:  Constitutional: Negative.  HENT: see above  Eyes: Negative.  Respiratory: Negative.  Cardiovascular: Negative.  Gastrointestinal: Negative.  Musculoskeletal: Negative.  Skin: Negative.  Renal: Negative  Endocrine: Negative  Psychiatric/Behavioral: Negative.     Physical Examination:  Vitals: Last menstrual period 03/15/2022, not currently breastfeeding.     General: Breathing comfortably on room air while sitting up. Able to communicate verbally. Voice normal. Normal appearing body habitus.     Musculoskeletal: Head: Atraumatic and normocephalic.     Neck: Full ROM and able to extend without issues; Thyroid non tender to palpation without evidence of mass or nodules.     Eyes: Extraocular movements intact and pupils equally reactive to light stimulus. No spontaneous or gaze-evoked nystagmus. No proptosis or ecchymosis. VFI.     Lymphatic: No significant cervical lymphadenopathy noted.     Neuro: CN 7 intact with symmetric mobility and strength. No loss of facial sensation.     Skin: Dry, normal turgor, normal color.     Psych: Alert and oriented to person/place/time. Normal affect, amiable     Significant labs:    TSH 4.523       Assessment/Plan:     Thyroid nodules: will order US to further evaluate.        Darvin Flower MD       [1]   Past Medical History:   Contusion of knee    Diverticulitis    Dysmenorrhea    Left ovarian cyst    Lipid screening    Lumbar strain    she injured her back about a week ago while lifting a child for about an  hour and half    Lump in chest    mass near the sternal notch likely a lipoma    Right ovarian cyst    Uterine fibroid    two anterior-inferior uterine body fibroids    Visual impairment    contacts and glasses   [2]   Past Surgical History:  Procedure Laterality Date    Romina localization wire 1 site left (cpt=19281) Left     mastitis    Romina localization wire 1 site right (cpt=19281) Right     mastitis      10/27/1994; 2007    Other surgical history      Left elbow osteomyelitis    Upper arm/elbow surgery unlisted      left elbow for osteomyelitis   [3] [4] [5] [6]   Allergies  Allergen Reactions    Novocain [Procaine Hcl] DIZZINESS   [7]   Family History  Problem Relation Age of Onset    Hypertension Mother     Cancer Paternal Grandmother         liver cancer?    Hypertension Father     Other (MI) Father 74

## 2025-07-30 ENCOUNTER — HOSPITAL ENCOUNTER (OUTPATIENT)
Dept: MAMMOGRAPHY | Age: 55
Discharge: HOME | End: 2025-07-30
Attending: INTERNAL MEDICINE

## 2025-07-30 DIAGNOSIS — Z12.31 ENCOUNTER FOR SCREENING MAMMOGRAM FOR MALIGNANT NEOPLASM OF BREAST: ICD-10-CM

## 2025-07-30 PROCEDURE — 77067 SCR MAMMO BI INCL CAD: CPT | Performed by: INTERNAL MEDICINE

## 2025-07-30 PROCEDURE — 77063 BREAST TOMOSYNTHESIS BI: CPT | Performed by: INTERNAL MEDICINE

## 2025-08-26 ENCOUNTER — HOSPITAL ENCOUNTER (OUTPATIENT)
Dept: ULTRASOUND IMAGING | Age: 55
Discharge: HOME | End: 2025-08-26
Attending: OTOLARYNGOLOGY

## 2025-08-26 DIAGNOSIS — E04.2 MULTIPLE THYROID NODULES: ICD-10-CM

## 2025-08-26 PROCEDURE — 76536 US EXAM OF HEAD AND NECK: CPT | Performed by: OTOLARYNGOLOGY

## (undated) DEVICE — BREAST-HERNIA-PORT CDS-LF: Brand: MEDLINE INDUSTRIES, INC.

## (undated) DEVICE — GOWN,SIRUS,FABRIC-REINFORCED,LARGE: Brand: MEDLINE

## (undated) DEVICE — UNDERPAD INCONT W23XL36IN STD BLU POLYPR BK

## (undated) DEVICE — DRAPE,TAPE STRIPS,STERILE: Brand: MEDLINE

## (undated) DEVICE — SUTURE VCRL SZ 3-0 L27IN ABSRB UD L26MM SH

## (undated) DEVICE — SLEEVE COMPR M KNEE LEN SGL USE KENDALL SCD

## (undated) DEVICE — CLIP LIG M BLU TI HRT SHP WIRE HORZ

## (undated) DEVICE — SUT SLK 2-0 18IN FS BK

## (undated) DEVICE — CLIP SUR SM TI HRT SHP WIRE HORZ LIG SYS

## (undated) DEVICE — PAD,ABDOMINAL,8"X7.5",ST,LF,20/BX: Brand: MEDLINE INDUSTRIES, INC.

## (undated) DEVICE — SOLUTION IRRIG 1000ML 0.9% NACL USP BTL

## (undated) DEVICE — STERILE POLYISOPRENE POWDER-FREE SURGICAL GLOVES: Brand: PROTEXIS

## (undated) DEVICE — DRAPE PACK CHEST

## (undated) DEVICE — ELECTRODE ES L2.75IN XLN STD BLDE MOD E-Z CLN

## (undated) DEVICE — BRA SURGICAL ELIZABETH PINK L

## (undated) DEVICE — SUTURE MCRYL SZ 4-0 L18IN ABSRB UD L19MM PS-2

## (undated) DEVICE — 3M™ STERI-DRAPE™ INSTRUMENT POUCH 1018: Brand: STERI-DRAPE™

## (undated) NOTE — MR AVS SNAPSHOT
[address] [address] [phone]               Thank you for choosing us for your health care visit with Jacqueline Cespedes MD.  We are glad to serve you and happy to provide you with Summaries. If you've been to the Emergency Department or your doctor's office, you can view your past visit information in baseclick by going to Visits < Visit Summaries. baseclick questions? Call [phone] for help.   baseclick is NOT to be used for urge

## (undated) NOTE — MR AVS SNAPSHOT
[address] [address] [phone]               Thank you for choosing us for your health care visit with Mckenzie Whalen MD.  We are glad to serve you and happy to provide you with Assoc Dx:  Chest pain, unspecified type [R07.9]           XR CHEST PA + LAT CHEST (CPT=71020)    Complete by:   Feb 07, 2017 (Approximate)    Assoc Dx:  Chest pain, unspecified type [R07.9]           Cardio Referral - In Network    Complete by:  As directe authorization, such as [place], please feel free to schedule your appointment immediately. However, if you are unsure about the requirements for authorization, please wait 5-7 days and then contact your physician's office.  At that time, you will Water is best for hydration Fast food. Eat at home when possible     Tips for increasing your physical activity – Adults who are physically active are less likely to develop some chronic diseases than adults who are inactive.      HOW TO GET STARTED: HOW

## (undated) NOTE — ED AVS SNAPSHOT
Alvarado Grace   MRN: [id number]    Department:  [facility] Emergency Department   Date of Visit:  4/22/2018           Disclosure     Insurance plans vary and the physician(s) referred by the ER may not be covered by your plan.  Please contact tell this physician (or your personal doctor if your instructions are to return to your personal doctor) about any new or lasting problems. The primary care or specialist physician will see patients referred from the [facility] Emergency Department.  Isidra Johnson